# Patient Record
Sex: MALE | Race: WHITE | NOT HISPANIC OR LATINO | Employment: OTHER | URBAN - METROPOLITAN AREA
[De-identification: names, ages, dates, MRNs, and addresses within clinical notes are randomized per-mention and may not be internally consistent; named-entity substitution may affect disease eponyms.]

---

## 2019-06-19 ENCOUNTER — HOSPITAL ENCOUNTER (OUTPATIENT)
Dept: GENERAL RADIOLOGY | Facility: HOSPITAL | Age: 44
Discharge: HOME | End: 2019-06-19
Attending: INTERNAL MEDICINE

## 2021-01-05 ENCOUNTER — HOSPITAL ENCOUNTER (OUTPATIENT)
Dept: OTHER | Facility: HOSPITAL | Age: 46
Discharge: HOME | End: 2021-01-05
Attending: INTERNAL MEDICINE

## 2021-01-05 LAB — SARS-COV-2 RNA SPEC QL NAA+PROBE: DETECTED

## 2021-01-14 ENCOUNTER — HOSPITAL ENCOUNTER (OUTPATIENT)
Dept: GASTROENTEROLOGY | Facility: HOSPITAL | Age: 46
Discharge: HOME | End: 2021-01-14
Attending: INTERNAL MEDICINE

## 2021-01-14 ENCOUNTER — HOSPITAL ENCOUNTER (OUTPATIENT)
Dept: GENERAL RADIOLOGY | Facility: HOSPITAL | Age: 46
Discharge: HOME | End: 2021-01-14
Attending: INTERNAL MEDICINE

## 2021-07-20 ENCOUNTER — TRANSCRIBE ORDERS (OUTPATIENT)
Dept: LAB | Facility: HOSPITAL | Age: 46
End: 2021-07-20

## 2021-07-20 ENCOUNTER — LAB (OUTPATIENT)
Dept: LAB | Facility: HOSPITAL | Age: 46
End: 2021-07-20

## 2021-07-20 DIAGNOSIS — Z86.16 HISTORY OF COVID-19: ICD-10-CM

## 2021-07-20 DIAGNOSIS — R05.9 COUGH: ICD-10-CM

## 2021-07-20 DIAGNOSIS — R05.9 COUGH: Primary | ICD-10-CM

## 2021-07-20 LAB
ALBUMIN SERPL-MCNC: 4.2 G/DL (ref 3.5–5.2)
ALBUMIN/GLOB SERPL: 1.4 G/DL
ALP SERPL-CCNC: 53 U/L (ref 39–117)
ALT SERPL W P-5'-P-CCNC: 38 U/L (ref 1–41)
ANION GAP SERPL CALCULATED.3IONS-SCNC: 9.9 MMOL/L (ref 5–15)
AST SERPL-CCNC: 33 U/L (ref 1–40)
BASOPHILS # BLD AUTO: 0.1 10*3/MM3 (ref 0–0.2)
BASOPHILS NFR BLD AUTO: 1.4 % (ref 0–1.5)
BILIRUB SERPL-MCNC: 0.6 MG/DL (ref 0–1.2)
BUN SERPL-MCNC: 14 MG/DL (ref 6–20)
BUN/CREAT SERPL: 14.3 (ref 7–25)
CALCIUM SPEC-SCNC: 9.3 MG/DL (ref 8.6–10.5)
CHLORIDE SERPL-SCNC: 103 MMOL/L (ref 98–107)
CHOLEST SERPL-MCNC: 249 MG/DL (ref 0–200)
CO2 SERPL-SCNC: 23.1 MMOL/L (ref 22–29)
CREAT SERPL-MCNC: 0.98 MG/DL (ref 0.76–1.27)
DEPRECATED RDW RBC AUTO: 45.7 FL (ref 37–54)
EOSINOPHIL # BLD AUTO: 0.25 10*3/MM3 (ref 0–0.4)
EOSINOPHIL NFR BLD AUTO: 3.5 % (ref 0.3–6.2)
ERYTHROCYTE [DISTWIDTH] IN BLOOD BY AUTOMATED COUNT: 13.4 % (ref 12.3–15.4)
GFR SERPL CREATININE-BSD FRML MDRD: 83 ML/MIN/1.73
GLOBULIN UR ELPH-MCNC: 2.9 GM/DL
GLUCOSE SERPL-MCNC: 88 MG/DL (ref 65–99)
HCT VFR BLD AUTO: 51.4 % (ref 37.5–51)
HDLC SERPL-MCNC: 29 MG/DL (ref 40–60)
HGB BLD-MCNC: 17.7 G/DL (ref 13–17.7)
IMM GRANULOCYTES # BLD AUTO: 0.03 10*3/MM3 (ref 0–0.05)
IMM GRANULOCYTES NFR BLD AUTO: 0.4 % (ref 0–0.5)
LDLC SERPL CALC-MCNC: 120 MG/DL (ref 0–100)
LDLC/HDLC SERPL: 3.74 {RATIO}
LYMPHOCYTES # BLD AUTO: 2.03 10*3/MM3 (ref 0.7–3.1)
LYMPHOCYTES NFR BLD AUTO: 28.6 % (ref 19.6–45.3)
MCH RBC QN AUTO: 31.6 PG (ref 26.6–33)
MCHC RBC AUTO-ENTMCNC: 34.4 G/DL (ref 31.5–35.7)
MCV RBC AUTO: 91.8 FL (ref 79–97)
MONOCYTES # BLD AUTO: 0.67 10*3/MM3 (ref 0.1–0.9)
MONOCYTES NFR BLD AUTO: 9.4 % (ref 5–12)
NEUTROPHILS NFR BLD AUTO: 4.02 10*3/MM3 (ref 1.7–7)
NEUTROPHILS NFR BLD AUTO: 56.7 % (ref 42.7–76)
NRBC BLD AUTO-RTO: 0 /100 WBC (ref 0–0.2)
PLATELET # BLD AUTO: 203 10*3/MM3 (ref 140–450)
PMV BLD AUTO: 10.7 FL (ref 6–12)
POTASSIUM SERPL-SCNC: 4.5 MMOL/L (ref 3.5–5.2)
PROT SERPL-MCNC: 7.1 G/DL (ref 6–8.5)
RBC # BLD AUTO: 5.6 10*6/MM3 (ref 4.14–5.8)
SODIUM SERPL-SCNC: 136 MMOL/L (ref 136–145)
TRIGL SERPL-MCNC: 558 MG/DL (ref 0–150)
VLDLC SERPL-MCNC: 100 MG/DL (ref 5–40)
WBC # BLD AUTO: 7.1 10*3/MM3 (ref 3.4–10.8)

## 2021-07-20 PROCEDURE — 36415 COLL VENOUS BLD VENIPUNCTURE: CPT

## 2021-07-20 PROCEDURE — 80053 COMPREHEN METABOLIC PANEL: CPT

## 2021-07-20 PROCEDURE — 80061 LIPID PANEL: CPT

## 2021-07-20 PROCEDURE — 85025 COMPLETE CBC W/AUTO DIFF WBC: CPT

## 2021-07-21 RX ORDER — ACETAMINOPHEN AND CODEINE PHOSPHATE 300; 30 MG/1; MG/1
TABLET ORAL EVERY 6 HOURS SCHEDULED
COMMUNITY
End: 2021-11-13

## 2021-07-27 PROBLEM — U07.1 PNEUMONIA DUE TO COVID-19 VIRUS: Status: ACTIVE | Noted: 2021-07-27

## 2021-07-27 PROBLEM — J12.82 PNEUMONIA DUE TO COVID-19 VIRUS: Status: ACTIVE | Noted: 2021-07-27

## 2021-07-28 ENCOUNTER — OFFICE VISIT (OUTPATIENT)
Dept: INTERNAL MEDICINE | Facility: CLINIC | Age: 46
End: 2021-07-28

## 2021-07-28 VITALS
TEMPERATURE: 97.9 F | HEIGHT: 70 IN | BODY MASS INDEX: 39.43 KG/M2 | SYSTOLIC BLOOD PRESSURE: 130 MMHG | WEIGHT: 275.4 LBS | HEART RATE: 85 BPM | DIASTOLIC BLOOD PRESSURE: 92 MMHG | OXYGEN SATURATION: 98 %

## 2021-07-28 DIAGNOSIS — E66.01 CLASS 2 SEVERE OBESITY DUE TO EXCESS CALORIES WITH SERIOUS COMORBIDITY IN ADULT, UNSPECIFIED BMI (HCC): ICD-10-CM

## 2021-07-28 DIAGNOSIS — D75.1 POLYCYTHEMIA: ICD-10-CM

## 2021-07-28 DIAGNOSIS — U07.1 PNEUMONIA DUE TO COVID-19 VIRUS: Primary | ICD-10-CM

## 2021-07-28 DIAGNOSIS — E78.2 MIXED HYPERLIPIDEMIA: ICD-10-CM

## 2021-07-28 DIAGNOSIS — J12.82 PNEUMONIA DUE TO COVID-19 VIRUS: Primary | ICD-10-CM

## 2021-07-28 PROBLEM — E66.812 CLASS 2 SEVERE OBESITY DUE TO EXCESS CALORIES WITH SERIOUS COMORBIDITY IN ADULT: Status: ACTIVE | Noted: 2021-07-28

## 2021-07-28 PROCEDURE — 99214 OFFICE O/P EST MOD 30 MIN: CPT | Performed by: INTERNAL MEDICINE

## 2021-07-28 RX ORDER — CETIRIZINE HYDROCHLORIDE 10 MG/1
10 CAPSULE, LIQUID FILLED ORAL DAILY
COMMUNITY

## 2021-07-28 RX ORDER — LANOLIN ALCOHOL/MO/W.PET/CERES
50 CREAM (GRAM) TOPICAL DAILY
COMMUNITY

## 2021-07-28 RX ORDER — CHOLECALCIFEROL (VITAMIN D3) 125 MCG
500 CAPSULE ORAL DAILY
COMMUNITY

## 2021-07-28 RX ORDER — MELATONIN
1000 DAILY
COMMUNITY

## 2021-07-28 RX ORDER — ROSUVASTATIN CALCIUM 10 MG/1
10 TABLET, COATED ORAL NIGHTLY
Qty: 90 TABLET | Refills: 3 | Status: SHIPPED | OUTPATIENT
Start: 2021-07-28 | End: 2023-03-01

## 2021-07-28 RX ORDER — MULTIVIT WITH MINERALS/LUTEIN
250 TABLET ORAL DAILY
COMMUNITY

## 2021-07-28 NOTE — PROGRESS NOTES
"Chief Complaint  Follow-up (Had COVID in January, he is doing ok, he seems tired at night. )    Subjective  fatigue, no new breathing issues,           Calvin Nye presents to [facility]      Objective   Vital Signs  Vitals:    07/28/21 1306   BP: 130/92   Pulse: 85   Temp: 97.9 °F (36.6 °C)   SpO2: 98%   Weight: 125 kg (275 lb 6.4 oz)   Height: 177.8 cm (70\")      Review of Systems   Constitutional: Negative.    HENT: Negative.    Eyes: Negative.    Respiratory: Negative.    Cardiovascular: Negative.    Gastrointestinal: Negative.    Endocrine: Negative.    Genitourinary: Negative.    Musculoskeletal: Negative.    Allergic/Immunologic: Negative.    Neurological: Negative.    Hematological: Negative.    Psychiatric/Behavioral: Negative.     wgt gain    Physical Exam  Constitutional:       General: He is not in acute distress.     Appearance: Normal appearance.   HENT:      Head: Normocephalic.      Mouth/Throat:      Mouth: Mucous membranes are moist.   Eyes:      Conjunctiva/sclera: Conjunctivae normal.      Pupils: Pupils are equal, round, and reactive to light.   Cardiovascular:      Rate and Rhythm: Normal rate and regular rhythm.      Pulses: Normal pulses.      Heart sounds: Normal heart sounds.   Pulmonary:      Effort: Pulmonary effort is normal.      Breath sounds: Normal breath sounds.   Abdominal:      General: Abdomen is flat. Bowel sounds are normal.      Palpations: Abdomen is soft.   Musculoskeletal:         General: No swelling. Normal range of motion.      Cervical back: Neck supple.   Skin:     General: Skin is warm and dry.      Coloration: Skin is not jaundiced.   Neurological:      General: No focal deficit present.      Mental Status: He is alert and oriented to person, place, and time. Mental status is at baseline.   Psychiatric:         Mood and Affect: Mood normal.         Behavior: Behavior normal.         Thought Content: Thought content normal.  "        Judgment: Judgment normal.      obese, soft   Result Review :   Lab Results   Component Value Date     01/15/2021     CMP    CMP 1/18/21 1/19/21 7/20/21   Glucose   88   Glucose 96 87    BUN 21 20 14   Creatinine 0.82 0.70 0.98   eGFR Non African Am   83   Sodium 135 136 136   Potassium 4.1 4.4 4.5   Chloride 103 103 103   Calcium 9.3 9.6 9.3   Albumin 2.9 (A) 2.9 (A) 4.20   Total Bilirubin 0.40 0.42 0.6   Alkaline Phosphatase 35 (A) 37 (A) 53   AST (SGOT) 29 20 33   ALT (SGPT) 52 (A) 50 (A) 38   (A) Abnormal value       Comments are available for some flowsheets but are not being displayed.           CBC w/diff    CBC w/Diff 1/15/21 1/16/21 7/20/21   WBC 9.67 8.09 7.10   RBC 5.63 5.12 5.60   Hemoglobin 17.1 15.7 17.7   Hematocrit 49.5 45.3 51.4 (A)   MCV 87.9 88.5 91.8   MCH 30.4 30.7 31.6   MCHC 34.5 34.7 34.4   RDW 12.3 12.5 13.4   Platelets 251 235 203   Neutrophil Rel % 89.7 (A) 83.1 56.7   Immature Granulocyte Rel %   0.4   Lymphocyte Rel % 4.7 (A) 9.1 (A) 28.6   Monocyte Rel % 4.4 6.1 9.4   Eosinophil Rel % 0.0 0.0 3.5   Basophil Rel % 0.1 0.1 1.4   (A) Abnormal value             Lipid Panel    Lipid Panel 7/20/21   Total Cholesterol 249 (A)   Triglycerides 558 (A)   HDL Cholesterol 29 (A)   VLDL Cholesterol 100 (A)   LDL Cholesterol  120 (A)   LDL/HDL Ratio 3.74   (A) Abnormal value             Lab Results   Component Value Date    TSH 0.117 (L) 01/16/2021      Lab Results   Component Value Date    FREET4 0.9 01/16/2021                          Assessment and Plan    Elevated cholesterol, triglycerides, cautioned on alcohol use, recommend walking program, weight loss, and statin therapy to lower cardiac risks, will start with Crestor 10 mg daily, co-Q10 recommended with this, and recheck again in 4 to 6 months    Weight gain, discussed with patient today July 28, 2021    Increased hematocrit, polycythemia possibly secondary possibly secondary to obstructive sleep apnea discussed with patient  consider sleep study, other possibilities cannot be ruled out such as polycythemia vera, consider hematology oncology opinion if numbers do not improve by next visit,    Transitional care visit from hospital stay last week January 18 19th 2021, hypoxia acute respiratory failure, clinically improved, went home on 2 L of oxygen, finished up a course of roomed as severe in the hospital, IV steroids, clinically improved, COVID POSITIVE JAN 5, 2021 ,--      Follow Up   No follow-ups on file.  Patient was given instructions and counseling regarding his condition or for health maintenance advice. Please see specific information pulled into the AVS if appropriate.

## 2021-11-22 ENCOUNTER — OFFICE VISIT (OUTPATIENT)
Dept: INTERNAL MEDICINE | Facility: CLINIC | Age: 46
End: 2021-11-22

## 2021-11-22 ENCOUNTER — TELEPHONE (OUTPATIENT)
Dept: INTERNAL MEDICINE | Facility: CLINIC | Age: 46
End: 2021-11-22

## 2021-11-22 VITALS
TEMPERATURE: 97.6 F | BODY MASS INDEX: 37.8 KG/M2 | DIASTOLIC BLOOD PRESSURE: 82 MMHG | WEIGHT: 264 LBS | HEART RATE: 86 BPM | HEIGHT: 70 IN | OXYGEN SATURATION: 97 % | SYSTOLIC BLOOD PRESSURE: 124 MMHG

## 2021-11-22 DIAGNOSIS — J02.9 ACUTE PHARYNGITIS, UNSPECIFIED ETIOLOGY: Primary | ICD-10-CM

## 2021-11-22 DIAGNOSIS — R05.9 COUGH, UNSPECIFIED: ICD-10-CM

## 2021-11-22 PROCEDURE — 99213 OFFICE O/P EST LOW 20 MIN: CPT | Performed by: INTERNAL MEDICINE

## 2021-11-22 RX ORDER — BENZONATATE 100 MG/1
100 CAPSULE ORAL 3 TIMES DAILY PRN
Qty: 60 CAPSULE | Refills: 1 | Status: SHIPPED | OUTPATIENT
Start: 2021-11-22 | End: 2023-03-01

## 2021-11-22 RX ORDER — PREDNISONE 50 MG/1
50 TABLET ORAL DAILY
Qty: 5 TABLET | Refills: 0 | Status: SHIPPED | OUTPATIENT
Start: 2021-11-22 | End: 2023-03-01

## 2021-11-22 NOTE — TELEPHONE ENCOUNTER
"WIFE CALLED. PT WAS SEEN AT URGENT CARE ON 11/13 DX'D WITH TONSILLITIS, SWOLLEN GLANDS, FEVER.  GIVEN AMOX.  PATIENT HAS DEVELOPED \"A REALLY HORRIBLE COUGH\" PER WIFE. SHE IS WANTING HIM TO BE SEEN BY ANNA. NOT WILLING TO SEE NP.  STATES ANY TIME HE NEEDED TO BE SEEN, THE OFFICE WOULD WORK HIM IN.       WIFE STATES HE DOES NOT HAVE COVID (HAD COVID PNEUMONIA BACK IN July).      DOES PT. NEED TO BE SEEN?       JUSTINA # [phone]  "

## 2021-11-22 NOTE — PROGRESS NOTES
"Chief Complaint/ HPI: Recent tonsillitis, acute pharyngitis,, NOV 13, 2021, WENT TO [facility]UTE [facility] ,  NOW WITH COUGH, BEEN TRAVELING A LOT  Now with intractable coughing, especially at night,          Objective   Vital Signs  Vitals:    11/22/21 1625   BP: 124/82   BP Location: Left arm   Patient Position: Sitting   Pulse: 86   Temp: 97.6 °F (36.4 °C)   SpO2: 97%   Weight: 120 kg (264 lb)   Height: 177.8 cm (70\")      Body mass index is 37.88 kg/m².  Review of Systems   Constitutional: Negative.    HENT: Negative.    Eyes: Negative.    Respiratory: Negative.    Cardiovascular: Negative.    Gastrointestinal: Negative.    Endocrine: Negative.    Genitourinary: Negative.    Musculoskeletal: Negative.    Allergic/Immunologic: Negative.    Neurological: Negative.    Hematological: Negative.    Psychiatric/Behavioral: Negative.    , Coughing, no fevers currently,  SOME NAUSEA    Physical Exam --NECK normal,  THROAT tonsillar swelling, some erythema dark discoloration, no exudates, uvula slightly swollen, small airway noted,  LUNG clear bilaterally posterior,  CV, regular rhythm,  Alert oriented x3, no facial rashes,  Result Review :   Lab Results   Component Value Date     01/15/2021     CMP    CMP 1/18/21 1/19/21 7/20/21   Glucose 96 87 88   BUN 21 20 14   Creatinine 0.82 0.70 0.98   eGFR Non African Am   83   Sodium 135 136 136   Potassium 4.1 4.4 4.5   Chloride 103 103 103   Calcium 9.3 9.6 9.3   Albumin 2.9 (A) 2.9 (A) 4.20   Total Bilirubin 0.40 0.42 0.6   Alkaline Phosphatase 35 (A) 37 (A) 53   AST (SGOT) 29 20 33   ALT (SGPT) 52 (A) 50 (A) 38   (A) Abnormal value       Comments are available for some flowsheets but are not being displayed.           CBC w/diff    CBC w/Diff 1/15/21 1/16/21 7/20/21   WBC 9.67 8.09 7.10   RBC 5.63 5.12 5.60   Hemoglobin 17.1 15.7 17.7   Hematocrit 49.5 45.3 51.4 (A)   MCV 87.9 88.5 91.8   MCH 30.4 30.7 31.6   MCHC 34.5 34.7 34.4   RDW 12.3 12.5 13.4   Platelets 251 235 " 203   Neutrophil Rel % 89.7 (A) 83.1 56.7   Immature Granulocyte Rel %   0.4   Lymphocyte Rel % 4.7 (A) 9.1 (A) 28.6   Monocyte Rel % 4.4 6.1 9.4   Eosinophil Rel % 0.0 0.0 3.5   Basophil Rel % 0.1 0.1 1.4   (A) Abnormal value             Lipid Panel    Lipid Panel 7/20/21   Total Cholesterol 249 (A)   Triglycerides 558 (A)   HDL Cholesterol 29 (A)   VLDL Cholesterol 100 (A)   LDL Cholesterol  120 (A)   LDL/HDL Ratio 3.74   (A) Abnormal value             Lab Results   Component Value Date    TSH 0.117 (L) 01/16/2021      Lab Results   Component Value Date    FREET4 0.9 01/16/2021                          Assessment and Plan   Diagnoses and all orders for this visit:    1. Acute pharyngitis, unspecified etiology (Primary)  -     HYDROcod Polst-CPM Polst ER (Tussionex Pennkinetic ER) 10-8 MG/5ML ER suspension; Take 5 mL by mouth Every 12 (Twelve) Hours As Needed for Cough.  Dispense: 100 mL; Refill: 0    2. Cough, unspecified  -     HYDROcod Polst-CPM Polst ER (Tussionex Pennkinetic ER) 10-8 MG/5ML ER suspension; Take 5 mL by mouth Every 12 (Twelve) Hours As Needed for Cough.  Dispense: 100 mL; Refill: 0    Other orders  -     predniSONE (DELTASONE) 50 MG tablet; Take 1 tablet by mouth Daily.  Dispense: 5 tablet; Refill: 0  -     benzonatate (Tessalon Perles) 100 MG capsule; Take 1 capsule by mouth 3 (Three) Times a Day As Needed for Cough.  Dispense: 60 capsule; Refill: 1      Acute pharyngitis, status post treatment with Augmentin, finishing course as of November 23, 2021, now with continued cough, will treat with Tussionex, some prednisone possibly, consider some allergy medication, patient is currently taking Zyrtec, will treat with Tessalon Perles also,    Follow Up   No follow-ups on file.  Patient was given instructions and counseling regarding his condition or for health maintenance advice. Please see specific information pulled into the AVS if appropriate.

## 2021-12-06 ENCOUNTER — TELEPHONE (OUTPATIENT)
Dept: INTERNAL MEDICINE | Facility: CLINIC | Age: 46
End: 2021-12-06

## 2021-12-06 DIAGNOSIS — R05.9 COUGH: Primary | ICD-10-CM

## 2021-12-06 RX ORDER — BENZONATATE 100 MG/1
100 CAPSULE ORAL 3 TIMES DAILY PRN
Qty: 45 CAPSULE | Refills: 2 | Status: SHIPPED | OUTPATIENT
Start: 2021-12-06 | End: 2023-03-01

## 2021-12-06 RX ORDER — DEXAMETHASONE 4 MG/1
4 TABLET ORAL 3 TIMES DAILY
Qty: 15 TABLET | Refills: 0 | Status: SHIPPED | OUTPATIENT
Start: 2021-12-06 | End: 2023-03-01

## 2021-12-06 RX ORDER — AZITHROMYCIN 250 MG/1
TABLET, FILM COATED ORAL
Qty: 6 TABLET | Refills: 0 | Status: SHIPPED | OUTPATIENT
Start: 2021-12-06 | End: 2021-12-11

## 2021-12-06 NOTE — TELEPHONE ENCOUNTER
Z-Jonathan  Tussionex 5 cc every 12 hours  Decadron 4 mg 3 times daily #15 no refills  Tessalon Perles    Tell patient I called all these in for him to his drugstore that is listed

## 2021-12-06 NOTE — TELEPHONE ENCOUNTER
"Patient called and said he was seen in the office on nov, 22nd and was given a steroid to help with pharyngitis and was given a cough medicine to help with his cough. Patient states the cough has gotten worse and now sounds like a \"loud barking cough\" . He is wanting to know what else he can do or take to help this   "

## 2022-01-27 ENCOUNTER — LAB (OUTPATIENT)
Dept: LAB | Facility: HOSPITAL | Age: 47
End: 2022-01-27

## 2022-01-27 DIAGNOSIS — U07.1 PNEUMONIA DUE TO COVID-19 VIRUS: ICD-10-CM

## 2022-01-27 DIAGNOSIS — E78.2 MIXED HYPERLIPIDEMIA: ICD-10-CM

## 2022-01-27 DIAGNOSIS — D75.1 POLYCYTHEMIA: ICD-10-CM

## 2022-01-27 DIAGNOSIS — E66.01 CLASS 2 SEVERE OBESITY DUE TO EXCESS CALORIES WITH SERIOUS COMORBIDITY IN ADULT, UNSPECIFIED BMI: ICD-10-CM

## 2022-01-27 DIAGNOSIS — J12.82 PNEUMONIA DUE TO COVID-19 VIRUS: ICD-10-CM

## 2022-01-27 LAB
ALBUMIN SERPL-MCNC: 4.5 G/DL (ref 3.5–5.2)
ALBUMIN/GLOB SERPL: 1.7 G/DL
ALP SERPL-CCNC: 51 U/L (ref 39–117)
ALT SERPL W P-5'-P-CCNC: 29 U/L (ref 1–41)
ANION GAP SERPL CALCULATED.3IONS-SCNC: 11.2 MMOL/L (ref 5–15)
AST SERPL-CCNC: 25 U/L (ref 1–40)
BASOPHILS # BLD AUTO: 0.11 10*3/MM3 (ref 0–0.2)
BASOPHILS NFR BLD AUTO: 1.4 % (ref 0–1.5)
BILIRUB SERPL-MCNC: 0.4 MG/DL (ref 0–1.2)
BUN SERPL-MCNC: 17 MG/DL (ref 6–20)
BUN/CREAT SERPL: 17.5 (ref 7–25)
CALCIUM SPEC-SCNC: 9.6 MG/DL (ref 8.6–10.5)
CHLORIDE SERPL-SCNC: 104 MMOL/L (ref 98–107)
CHOLEST SERPL-MCNC: 151 MG/DL (ref 0–200)
CO2 SERPL-SCNC: 23.8 MMOL/L (ref 22–29)
CREAT SERPL-MCNC: 0.97 MG/DL (ref 0.76–1.27)
DEPRECATED RDW RBC AUTO: 45.8 FL (ref 37–54)
EOSINOPHIL # BLD AUTO: 0.23 10*3/MM3 (ref 0–0.4)
EOSINOPHIL NFR BLD AUTO: 3 % (ref 0.3–6.2)
ERYTHROCYTE [DISTWIDTH] IN BLOOD BY AUTOMATED COUNT: 13.7 % (ref 12.3–15.4)
GFR SERPL CREATININE-BSD FRML MDRD: 84 ML/MIN/1.73
GLOBULIN UR ELPH-MCNC: 2.6 GM/DL
GLUCOSE SERPL-MCNC: 98 MG/DL (ref 65–99)
HCT VFR BLD AUTO: 47.1 % (ref 37.5–51)
HDLC SERPL-MCNC: 30 MG/DL (ref 40–60)
HGB BLD-MCNC: 16.3 G/DL (ref 13–17.7)
IMM GRANULOCYTES # BLD AUTO: 0.02 10*3/MM3 (ref 0–0.05)
IMM GRANULOCYTES NFR BLD AUTO: 0.3 % (ref 0–0.5)
LDLC SERPL CALC-MCNC: 83 MG/DL (ref 0–100)
LDLC/HDLC SERPL: 2.53 {RATIO}
LYMPHOCYTES # BLD AUTO: 2.47 10*3/MM3 (ref 0.7–3.1)
LYMPHOCYTES NFR BLD AUTO: 32 % (ref 19.6–45.3)
MCH RBC QN AUTO: 31.5 PG (ref 26.6–33)
MCHC RBC AUTO-ENTMCNC: 34.6 G/DL (ref 31.5–35.7)
MCV RBC AUTO: 90.9 FL (ref 79–97)
MONOCYTES # BLD AUTO: 0.74 10*3/MM3 (ref 0.1–0.9)
MONOCYTES NFR BLD AUTO: 9.6 % (ref 5–12)
NEUTROPHILS NFR BLD AUTO: 4.16 10*3/MM3 (ref 1.7–7)
NEUTROPHILS NFR BLD AUTO: 53.7 % (ref 42.7–76)
NRBC BLD AUTO-RTO: 0 /100 WBC (ref 0–0.2)
PLATELET # BLD AUTO: 210 10*3/MM3 (ref 140–450)
PMV BLD AUTO: 10.8 FL (ref 6–12)
POTASSIUM SERPL-SCNC: 4.2 MMOL/L (ref 3.5–5.2)
PROT SERPL-MCNC: 7.1 G/DL (ref 6–8.5)
RBC # BLD AUTO: 5.18 10*6/MM3 (ref 4.14–5.8)
SODIUM SERPL-SCNC: 139 MMOL/L (ref 136–145)
T4 FREE SERPL-MCNC: 0.9 NG/DL (ref 0.93–1.7)
TRIGL SERPL-MCNC: 225 MG/DL (ref 0–150)
TSH SERPL DL<=0.05 MIU/L-ACNC: 1.39 UIU/ML (ref 0.27–4.2)
VLDLC SERPL-MCNC: 38 MG/DL (ref 5–40)
WBC NRBC COR # BLD: 7.73 10*3/MM3 (ref 3.4–10.8)

## 2022-01-27 PROCEDURE — 80061 LIPID PANEL: CPT

## 2022-01-27 PROCEDURE — 36415 COLL VENOUS BLD VENIPUNCTURE: CPT

## 2022-01-27 PROCEDURE — 80050 GENERAL HEALTH PANEL: CPT

## 2022-01-27 PROCEDURE — 84439 ASSAY OF FREE THYROXINE: CPT

## 2022-02-16 ENCOUNTER — OFFICE VISIT (OUTPATIENT)
Dept: INTERNAL MEDICINE | Facility: CLINIC | Age: 47
End: 2022-02-16

## 2022-02-16 VITALS
WEIGHT: 273.6 LBS | OXYGEN SATURATION: 96 % | SYSTOLIC BLOOD PRESSURE: 138 MMHG | HEIGHT: 70 IN | DIASTOLIC BLOOD PRESSURE: 84 MMHG | HEART RATE: 103 BPM | BODY MASS INDEX: 39.17 KG/M2 | TEMPERATURE: 97.7 F

## 2022-02-16 DIAGNOSIS — R53.83 FATIGUE, UNSPECIFIED TYPE: ICD-10-CM

## 2022-02-16 DIAGNOSIS — Z12.5 SCREENING PSA (PROSTATE SPECIFIC ANTIGEN): ICD-10-CM

## 2022-02-16 DIAGNOSIS — E78.2 MIXED HYPERLIPIDEMIA: Primary | ICD-10-CM

## 2022-02-16 DIAGNOSIS — E66.09 CLASS 2 OBESITY DUE TO EXCESS CALORIES WITHOUT SERIOUS COMORBIDITY WITH BODY MASS INDEX (BMI) OF 37.0 TO 37.9 IN ADULT: ICD-10-CM

## 2022-02-16 DIAGNOSIS — R05.9 COUGH, UNSPECIFIED: ICD-10-CM

## 2022-02-16 PROCEDURE — 99396 PREV VISIT EST AGE 40-64: CPT | Performed by: INTERNAL MEDICINE

## 2022-02-16 RX ORDER — ROSUVASTATIN CALCIUM 10 MG/1
10 TABLET, COATED ORAL DAILY
Qty: 90 TABLET | Refills: 3 | Status: SHIPPED | OUTPATIENT
Start: 2022-02-16 | End: 2023-03-31

## 2022-02-16 NOTE — PROGRESS NOTES
"Chief Complaint/ HPI: cough gone  Uri better  Here for preventive exam  No cp, no soa      Objective   Vital Signs  Vitals:    02/16/22 1304   BP: 138/84   BP Location: Left arm   Patient Position: Sitting   Cuff Size: Large Adult   Pulse: 103   Temp: 97.7 °F (36.5 °C)   SpO2: 96%   Weight: 124 kg (273 lb 9.6 oz)   Height: 177.8 cm (70\")      Body mass index is 39.26 kg/m².  Review of Systems   Constitutional: Negative.    HENT: Negative.    Eyes: Negative.    Respiratory: Negative.    Cardiovascular: Negative.    Gastrointestinal: Negative.    Endocrine: Negative.    Genitourinary: Negative.    Musculoskeletal: Negative.    Allergic/Immunologic: Negative.    Neurological: Negative.    Hematological: Negative.    Psychiatric/Behavioral: Negative.       Physical Exam  Constitutional:       General: He is not in acute distress.     Appearance: Normal appearance. He is obese.   HENT:      Head: Normocephalic.      Mouth/Throat:      Mouth: Mucous membranes are moist.   Eyes:      Conjunctiva/sclera: Conjunctivae normal.      Pupils: Pupils are equal, round, and reactive to light.   Cardiovascular:      Rate and Rhythm: Normal rate and regular rhythm.      Pulses: Normal pulses.      Heart sounds: Normal heart sounds.   Pulmonary:      Effort: Pulmonary effort is normal.      Breath sounds: Normal breath sounds.   Abdominal:      General: Bowel sounds are normal.      Palpations: Abdomen is soft.   Musculoskeletal:         General: No swelling. Normal range of motion.      Cervical back: Neck supple.   Skin:     General: Skin is warm and dry.      Coloration: Skin is not jaundiced.   Neurological:      General: No focal deficit present.      Mental Status: He is alert and oriented to person, place, and time. Mental status is at baseline.   Psychiatric:         Mood and Affect: Mood normal.         Behavior: Behavior normal.         Thought Content: Thought content normal.         Judgment: Judgment normal.        Result " Review :   Lab Results   Component Value Date     01/15/2021     CMP    CMP 7/20/21 1/27/22   Glucose 88 98   BUN 14 17   Creatinine 0.98 0.97   eGFR Non African Am 83 84   Sodium 136 139   Potassium 4.5 4.2   Chloride 103 104   Calcium 9.3 9.6   Albumin 4.20 4.50   Total Bilirubin 0.6 0.4   Alkaline Phosphatase 53 51   AST (SGOT) 33 25   ALT (SGPT) 38 29           CBC w/diff    CBC w/Diff 7/20/21 1/27/22   WBC 7.10 7.73   RBC 5.60 5.18   Hemoglobin 17.7 16.3   Hematocrit 51.4 (A) 47.1   MCV 91.8 90.9   MCH 31.6 31.5   MCHC 34.4 34.6   RDW 13.4 13.7   Platelets 203 210   Neutrophil Rel % 56.7 53.7   Immature Granulocyte Rel % 0.4 0.3   Lymphocyte Rel % 28.6 32.0   Monocyte Rel % 9.4 9.6   Eosinophil Rel % 3.5 3.0   Basophil Rel % 1.4 1.4   (A) Abnormal value             Lipid Panel    Lipid Panel 7/20/21 1/27/22   Total Cholesterol 249 (A) 151   Triglycerides 558 (A) 225 (A)   HDL Cholesterol 29 (A) 30 (A)   VLDL Cholesterol 100 (A) 38   LDL Cholesterol  120 (A) 83   LDL/HDL Ratio 3.74 2.53   (A) Abnormal value             Lab Results   Component Value Date    TSH 1.390 01/27/2022    TSH 0.117 (L) 01/16/2021      Lab Results   Component Value Date    FREET4 0.90 (L) 01/27/2022    FREET4 0.9 01/16/2021                          Visit Diagnoses:    ICD-10-CM ICD-9-CM   1. Mixed hyperlipidemia  E78.2 272.2   2. Cough, unspecified  R05.9 786.2   3. Class 2 obesity due to excess calories without serious comorbidity with body mass index (BMI) of 37.0 to 37.9 in adult  E66.09 278.00    Z68.37 V85.37   4. Fatigue, unspecified type  R53.83 780.79   5. Screening PSA (prostate specific antigen)  Z12.5 V76.44       Assessment and Plan   Diagnoses and all orders for this visit:    1. Mixed hyperlipidemia (Primary)  -     Lipid Panel; Future  -     Comprehensive Metabolic Panel; Future  -     CBC & Differential; Future  -     PSA Screen; Future  -     COVID-19,APTIMA PANTHER([place]),[facility], NP/OP SWAB IN UTM/VTM/[facility]  TRANSPORT MEDIA,24 HR TAT - Swab, Nasopharynx; Future    2. Cough, unspecified  -     Lipid Panel; Future  -     Comprehensive Metabolic Panel; Future  -     CBC & Differential; Future  -     PSA Screen; Future  -     COVID-19,APTIMA PANTHER([place]),[facility], NP/OP SWAB IN UTM/VTM/[facility] TRANSPORT MEDIA,24 HR TAT - Swab, Nasopharynx; Future    3. Class 2 obesity due to excess calories without serious comorbidity with body mass index (BMI) of 37.0 to 37.9 in adult  -     Lipid Panel; Future  -     Comprehensive Metabolic Panel; Future  -     CBC & Differential; Future  -     PSA Screen; Future  -     COVID-19,APTIMA PANTHER([place]),[facility], NP/OP SWAB IN UTM/VTM/[facility] TRANSPORT MEDIA,24 HR TAT - Swab, Nasopharynx; Future    4. Fatigue, unspecified type  -     Lipid Panel; Future  -     Comprehensive Metabolic Panel; Future  -     CBC & Differential; Future  -     PSA Screen; Future  -     COVID-19,APTIMA PANTHER([place]),[facility], NP/OP SWAB IN UTM/VTM/[facility] TRANSPORT MEDIA,24 HR TAT - Swab, Nasopharynx; Future    5. Screening PSA (prostate specific antigen)  -     Lipid Panel; Future  -     Comprehensive Metabolic Panel; Future  -     CBC & Differential; Future  -     PSA Screen; Future  -     COVID-19,APTIMA PANTHER([place]),[facility], NP/OP SWAB IN UTM/VTM/[facility] TRANSPORT MEDIA,24 HR TAT - Swab, Nasopharynx; Future    Other orders  -     rosuvastatin (Crestor) 10 MG tablet; Take 1 tablet by mouth Daily.  Dispense: 90 tablet; Refill: 3    Patient needs Covid testing prior to travel next week, February 2022    overwgt--encouraged wgt loss,     Elevated chol, --improved , cont crestor 10 mg qhs , qunol qhs     Fatigue    Recent uri jan 2022 resolved         Follow Up {Instructions Charge Capture  Follow-up Communications :23}  Return in about 1 year (around 2/16/2023).  Patient was given instructions and counseling regarding his condition or for health maintenance advice. Please see specific information  pulled into the AVS if appropriate.

## 2023-02-16 ENCOUNTER — LAB (OUTPATIENT)
Dept: INTERNAL MEDICINE | Facility: CLINIC | Age: 48
End: 2023-02-16
Payer: COMMERCIAL

## 2023-02-16 DIAGNOSIS — R53.83 FATIGUE, UNSPECIFIED TYPE: ICD-10-CM

## 2023-02-16 DIAGNOSIS — E66.09 CLASS 2 OBESITY DUE TO EXCESS CALORIES WITHOUT SERIOUS COMORBIDITY WITH BODY MASS INDEX (BMI) OF 37.0 TO 37.9 IN ADULT: ICD-10-CM

## 2023-02-16 DIAGNOSIS — E78.2 MIXED HYPERLIPIDEMIA: ICD-10-CM

## 2023-02-16 DIAGNOSIS — Z12.5 SCREENING PSA (PROSTATE SPECIFIC ANTIGEN): ICD-10-CM

## 2023-02-16 DIAGNOSIS — R05.9 COUGH, UNSPECIFIED: ICD-10-CM

## 2023-02-16 LAB
ALBUMIN SERPL-MCNC: 4.6 G/DL (ref 3.5–5.2)
ALBUMIN/GLOB SERPL: 1.7 G/DL
ALP SERPL-CCNC: 56 U/L (ref 39–117)
ALT SERPL W P-5'-P-CCNC: 34 U/L (ref 1–41)
ANION GAP SERPL CALCULATED.3IONS-SCNC: 10.7 MMOL/L (ref 5–15)
AST SERPL-CCNC: 25 U/L (ref 1–40)
BASOPHILS # BLD AUTO: 0.11 10*3/MM3 (ref 0–0.2)
BASOPHILS NFR BLD AUTO: 1.4 % (ref 0–1.5)
BILIRUB SERPL-MCNC: 0.4 MG/DL (ref 0–1.2)
BUN SERPL-MCNC: 21 MG/DL (ref 6–20)
BUN/CREAT SERPL: 19.1 (ref 7–25)
CALCIUM SPEC-SCNC: 9.7 MG/DL (ref 8.6–10.5)
CHLORIDE SERPL-SCNC: 101 MMOL/L (ref 98–107)
CHOLEST SERPL-MCNC: 134 MG/DL (ref 0–200)
CO2 SERPL-SCNC: 25.3 MMOL/L (ref 22–29)
CREAT SERPL-MCNC: 1.1 MG/DL (ref 0.76–1.27)
DEPRECATED RDW RBC AUTO: 43.2 FL (ref 37–54)
EGFRCR SERPLBLD CKD-EPI 2021: 83.8 ML/MIN/1.73
EOSINOPHIL # BLD AUTO: 0.21 10*3/MM3 (ref 0–0.4)
EOSINOPHIL NFR BLD AUTO: 2.6 % (ref 0.3–6.2)
ERYTHROCYTE [DISTWIDTH] IN BLOOD BY AUTOMATED COUNT: 13 % (ref 12.3–15.4)
GLOBULIN UR ELPH-MCNC: 2.7 GM/DL
GLUCOSE SERPL-MCNC: 99 MG/DL (ref 65–99)
HCT VFR BLD AUTO: 47.2 % (ref 37.5–51)
HDLC SERPL-MCNC: 38 MG/DL (ref 40–60)
HGB BLD-MCNC: 16.5 G/DL (ref 13–17.7)
IMM GRANULOCYTES # BLD AUTO: 0.03 10*3/MM3 (ref 0–0.05)
IMM GRANULOCYTES NFR BLD AUTO: 0.4 % (ref 0–0.5)
LDLC SERPL CALC-MCNC: 61 MG/DL (ref 0–100)
LDLC/HDLC SERPL: 1.41 {RATIO}
LYMPHOCYTES # BLD AUTO: 2.58 10*3/MM3 (ref 0.7–3.1)
LYMPHOCYTES NFR BLD AUTO: 32.1 % (ref 19.6–45.3)
MCH RBC QN AUTO: 31.9 PG (ref 26.6–33)
MCHC RBC AUTO-ENTMCNC: 35 G/DL (ref 31.5–35.7)
MCV RBC AUTO: 91.3 FL (ref 79–97)
MONOCYTES # BLD AUTO: 0.79 10*3/MM3 (ref 0.1–0.9)
MONOCYTES NFR BLD AUTO: 9.8 % (ref 5–12)
NEUTROPHILS NFR BLD AUTO: 4.31 10*3/MM3 (ref 1.7–7)
NEUTROPHILS NFR BLD AUTO: 53.7 % (ref 42.7–76)
NRBC BLD AUTO-RTO: 0 /100 WBC (ref 0–0.2)
PLATELET # BLD AUTO: 217 10*3/MM3 (ref 140–450)
PMV BLD AUTO: 10.7 FL (ref 6–12)
POTASSIUM SERPL-SCNC: 4.3 MMOL/L (ref 3.5–5.2)
PROT SERPL-MCNC: 7.3 G/DL (ref 6–8.5)
PSA SERPL-MCNC: 0.77 NG/ML (ref 0–4)
RBC # BLD AUTO: 5.17 10*6/MM3 (ref 4.14–5.8)
SODIUM SERPL-SCNC: 137 MMOL/L (ref 136–145)
TRIGL SERPL-MCNC: 212 MG/DL (ref 0–150)
VLDLC SERPL-MCNC: 35 MG/DL (ref 5–40)
WBC NRBC COR # BLD: 8.03 10*3/MM3 (ref 3.4–10.8)

## 2023-02-16 PROCEDURE — G0103 PSA SCREENING: HCPCS | Performed by: INTERNAL MEDICINE

## 2023-02-16 PROCEDURE — 80053 COMPREHEN METABOLIC PANEL: CPT | Performed by: INTERNAL MEDICINE

## 2023-02-16 PROCEDURE — 36415 COLL VENOUS BLD VENIPUNCTURE: CPT | Performed by: INTERNAL MEDICINE

## 2023-02-16 PROCEDURE — 80061 LIPID PANEL: CPT | Performed by: INTERNAL MEDICINE

## 2023-02-16 PROCEDURE — 85025 COMPLETE CBC W/AUTO DIFF WBC: CPT | Performed by: INTERNAL MEDICINE

## 2023-03-01 ENCOUNTER — OFFICE VISIT (OUTPATIENT)
Dept: INTERNAL MEDICINE | Facility: CLINIC | Age: 48
End: 2023-03-01
Payer: COMMERCIAL

## 2023-03-01 VITALS
HEART RATE: 86 BPM | OXYGEN SATURATION: 96 % | WEIGHT: 286.2 LBS | HEIGHT: 70 IN | DIASTOLIC BLOOD PRESSURE: 93 MMHG | SYSTOLIC BLOOD PRESSURE: 143 MMHG | TEMPERATURE: 97.8 F | BODY MASS INDEX: 40.97 KG/M2

## 2023-03-01 DIAGNOSIS — E78.2 MIXED HYPERLIPIDEMIA: ICD-10-CM

## 2023-03-01 DIAGNOSIS — E66.09 CLASS 2 OBESITY DUE TO EXCESS CALORIES WITHOUT SERIOUS COMORBIDITY WITH BODY MASS INDEX (BMI) OF 37.0 TO 37.9 IN ADULT: ICD-10-CM

## 2023-03-01 DIAGNOSIS — Z00.00 PHYSICAL EXAM, ANNUAL: ICD-10-CM

## 2023-03-01 DIAGNOSIS — D75.1 POLYCYTHEMIA: ICD-10-CM

## 2023-03-01 DIAGNOSIS — Z12.5 SCREENING PSA (PROSTATE SPECIFIC ANTIGEN): Primary | ICD-10-CM

## 2023-03-01 PROCEDURE — 99396 PREV VISIT EST AGE 40-64: CPT | Performed by: INTERNAL MEDICINE

## 2023-03-01 NOTE — PROGRESS NOTES
"CHIEF COMPLAINT/ HPI:  Annual Exam (YEARLY)  Here to follow-up with history of high cholesterol recent lab work, weight issues,    , Here for his annual checkup,  Preventive measures discussed, he wears a seatbelt, does not smoke, we discussed alcohol use, we encourage more aerobic activity, he is active and works on a regular basis,    Patient is using some preventative measures with Mucinex and some allergy medication to help prevent sinus infections which he gets on a regular basis throughout the year, seems to be working well he has not had any recent infections,    Less energy       Objective   Vital Signs  Vitals:    03/01/23 1458   BP: 143/93   Pulse: 86   Temp: 97.8 °F (36.6 °C)   SpO2: 96%   Weight: 130 kg (286 lb 3.2 oz)   Height: 177.8 cm (70\")      Body mass index is 41.07 kg/m².  Review of Systems   Constitutional: Negative.    HENT: Negative.    Eyes: Negative.    Respiratory: Negative.    Cardiovascular: Negative.    Gastrointestinal: Negative.    Endocrine: Negative.    Genitourinary: Negative.    Musculoskeletal: Negative.    Allergic/Immunologic: Negative.    Neurological: Negative.    Hematological: Negative.    Psychiatric/Behavioral: Negative.       Physical Exam  Constitutional:       General: He is not in acute distress.     Appearance: Normal appearance. He is obese.   HENT:      Head: Normocephalic.      Mouth/Throat:      Mouth: Mucous membranes are moist.   Eyes:      Conjunctiva/sclera: Conjunctivae normal.      Pupils: Pupils are equal, round, and reactive to light.   Cardiovascular:      Rate and Rhythm: Normal rate and regular rhythm.      Pulses: Normal pulses.      Heart sounds: Normal heart sounds.   Pulmonary:      Effort: Pulmonary effort is normal.      Breath sounds: Normal breath sounds.   Abdominal:      General: Bowel sounds are normal.      Palpations: Abdomen is soft.   Musculoskeletal:         General: No swelling. Normal range of motion.      Cervical back: Neck supple. "   Skin:     General: Skin is warm and dry.      Coloration: Skin is not jaundiced.   Neurological:      General: No focal deficit present.      Mental Status: He is alert and oriented to person, place, and time. Mental status is at baseline.   Psychiatric:         Mood and Affect: Mood normal.         Behavior: Behavior normal.         Thought Content: Thought content normal.         Judgment: Judgment normal.      trace edema b/l   Result Review :   Lab Results   Component Value Date     01/15/2021     CMP    CMP 2/16/23   Glucose 99   BUN 21 (A)   Creatinine 1.10   eGFR 83.8   Sodium 137   Potassium 4.3   Chloride 101   Calcium 9.7   Total Protein 7.3   Albumin 4.6   Globulin 2.7   Total Bilirubin 0.4   Alkaline Phosphatase 56   AST (SGOT) 25   ALT (SGPT) 34   Albumin/Globulin Ratio 1.7   BUN/Creatinine Ratio 19.1   Anion Gap 10.7   (A) Abnormal value            CBC w/diff    CBC w/Diff 2/16/23   WBC 8.03   RBC 5.17   Hemoglobin 16.5   Hematocrit 47.2   MCV 91.3   MCH 31.9   MCHC 35.0   RDW 13.0   Platelets 217   Neutrophil Rel % 53.7   Immature Granulocyte Rel % 0.4   Lymphocyte Rel % 32.1   Monocyte Rel % 9.8   Eosinophil Rel % 2.6   Basophil Rel % 1.4            Lipid Panel    Lipid Panel 2/16/23   Total Cholesterol 134   Triglycerides 212 (A)   HDL Cholesterol 38 (A)   VLDL Cholesterol 35   LDL Cholesterol  61   LDL/HDL Ratio 1.41   (A) Abnormal value             Lab Results   Component Value Date    TSH 1.390 01/27/2022    TSH 0.117 (L) 01/16/2021      Lab Results   Component Value Date    FREET4 0.90 (L) 01/27/2022    FREET4 0.9 01/16/2021         PSA    PSA 2/16/23   PSA 0.768                          Visit Diagnoses:    ICD-10-CM ICD-9-CM   1. Screening PSA (prostate specific antigen)  Z12.5 V76.44   2. Class 2 obesity due to excess calories without serious comorbidity with body mass index (BMI) of 37.0 to 37.9 in adult  E66.09 278.00    Z68.37 V85.37   3. Mixed hyperlipidemia  E78.2 272.2   4.  Polycythemia  D75.1 238.4   5. Physical exam, annual  Z00.00 V70.0       Assessment and Plan   Diagnoses and all orders for this visit:    1. Screening PSA (prostate specific antigen) (Primary)  -     Comprehensive Metabolic Panel; Future  -     CBC & Differential; Future  -     Lipid Panel; Future  -     PSA Screen; Future    2. Class 2 obesity due to excess calories without serious comorbidity with body mass index (BMI) of 37.0 to 37.9 in adult  -     Comprehensive Metabolic Panel; Future  -     CBC & Differential; Future  -     Lipid Panel; Future  -     PSA Screen; Future    3. Mixed hyperlipidemia  -     Comprehensive Metabolic Panel; Future  -     CBC & Differential; Future  -     Lipid Panel; Future  -     PSA Screen; Future    4. Polycythemia  -     Comprehensive Metabolic Panel; Future  -     CBC & Differential; Future  -     Lipid Panel; Future  -     PSA Screen; Future    5. Physical exam, annual  -     Comprehensive Metabolic Panel; Future  -     CBC & Differential; Future  -     Lipid Panel; Future  -     PSA Screen; Future    Other orders  -     Semaglutide-Weight Management 0.5 MG/0.5ML solution auto-injector; Inject 0.5 mL under the skin into the appropriate area as directed 1 (One) Time Per Week.  Dispense: 2 mL; Refill: 5    Annual exam, preventive measures discussed as above, March 1, 2023 continue exercise,    overwgt--weight up this year, March 1, 2023 encouraged wgt loss, March 1, 2023,, monitor carbs, increase protein intake and encouraged aerobic activity with walking on a regular basis,--- consider Wegovy, injections, discussed we will send in prescription to see if that helps and follow-up in 3 months,    Elevated chol, --improved , cont crestor 10 mg qhs , qunol qhs     Fatigue--consider sleep study , wgt loss--March 1, 2023,    PSA 0.7 February 16, 2023      Follow Up   Return in about 3 months (around 6/1/2023).  Patient was given instructions and counseling regarding his condition or  for health maintenance advice. Please see specific information pulled into the AVS if appropriate.

## 2023-03-31 RX ORDER — ROSUVASTATIN CALCIUM 10 MG/1
TABLET, COATED ORAL
Qty: 90 TABLET | Refills: 3 | Status: SHIPPED | OUTPATIENT
Start: 2023-03-31

## 2023-04-21 ENCOUNTER — TELEPHONE (OUTPATIENT)
Dept: INTERNAL MEDICINE | Facility: CLINIC | Age: 48
End: 2023-04-21
Payer: COMMERCIAL

## 2023-04-21 RX ORDER — SEMAGLUTIDE 1 MG/.5ML
1 INJECTION, SOLUTION SUBCUTANEOUS WEEKLY
Qty: 2 ML | Refills: 5 | Status: SHIPPED | OUTPATIENT
Start: 2023-04-21

## 2023-04-21 NOTE — TELEPHONE ENCOUNTER
Call patient, tell him I sent in the next dose for the Wegovy for a month and if he wants to go up again after that to let me know

## 2023-04-21 NOTE — TELEPHONE ENCOUNTER
Caller: Calvin Nye    Relationship: Self    Best call back number: [phone]    What medications are you currently taking:   Current Outpatient Medications on File Prior to Visit   Medication Sig Dispense Refill   • Cetirizine HCl (ZyrTEC Allergy) 10 MG capsule Take 10 mg by mouth Daily.     • cholecalciferol (VITAMIN D3) 25 MCG (1000 UT) tablet Take 1 tablet by mouth Daily.     • rosuvastatin (CRESTOR) 10 MG tablet TAKE 1 TABLET BY MOUTH EVERY DAY 90 tablet 3   • Semaglutide-Weight Management 0.5 MG/0.5ML solution auto-injector Inject 0.5 mL under the skin into the appropriate area as directed 1 (One) Time Per Week. 2 mL 5   • vitamin B-12 (CYANOCOBALAMIN) 500 MCG tablet Take 1 tablet by mouth Daily.     • vitamin B-6 (PYRIDOXINE) 50 MG tablet Take 1 tablet by mouth Daily.     • vitamin C (ASCORBIC ACID) 250 MG tablet Take 1 tablet by mouth Daily.     • Zinc 50 MG capsule Take 50 mg by mouth Daily.       No current facility-administered medications on file prior to visit.          Which medication are you concerned about: WEGOVY     Who prescribed you this medication: VEL    What are your concerns: PATIENT WOULD LIKE THIS INCREASED TO NEXT DOSAGE AS HE TAKES THE LAST SHOT OF CURRENT DOSAGE ON 04/27/2023

## 2023-06-06 ENCOUNTER — OFFICE VISIT (OUTPATIENT)
Dept: SLEEP MEDICINE | Facility: HOSPITAL | Age: 48
End: 2023-06-06
Payer: COMMERCIAL

## 2023-06-06 ENCOUNTER — OFFICE VISIT (OUTPATIENT)
Dept: INTERNAL MEDICINE | Facility: CLINIC | Age: 48
End: 2023-06-06
Payer: COMMERCIAL

## 2023-06-06 VITALS
WEIGHT: 262 LBS | DIASTOLIC BLOOD PRESSURE: 83 MMHG | OXYGEN SATURATION: 97 % | HEART RATE: 77 BPM | SYSTOLIC BLOOD PRESSURE: 121 MMHG | HEIGHT: 70 IN | BODY MASS INDEX: 37.51 KG/M2

## 2023-06-06 VITALS
DIASTOLIC BLOOD PRESSURE: 89 MMHG | HEART RATE: 81 BPM | OXYGEN SATURATION: 94 % | HEIGHT: 70 IN | SYSTOLIC BLOOD PRESSURE: 122 MMHG | WEIGHT: 263.8 LBS | BODY MASS INDEX: 37.77 KG/M2 | TEMPERATURE: 97.7 F

## 2023-06-06 DIAGNOSIS — G47.8 NON-RESTORATIVE SLEEP: ICD-10-CM

## 2023-06-06 DIAGNOSIS — R06.81 WITNESSED EPISODE OF APNEA: Primary | ICD-10-CM

## 2023-06-06 DIAGNOSIS — E66.09 CLASS 2 OBESITY DUE TO EXCESS CALORIES WITHOUT SERIOUS COMORBIDITY WITH BODY MASS INDEX (BMI) OF 37.0 TO 37.9 IN ADULT: Primary | ICD-10-CM

## 2023-06-06 DIAGNOSIS — E78.2 MIXED HYPERLIPIDEMIA: ICD-10-CM

## 2023-06-06 DIAGNOSIS — G47.10 HYPERSOMNIA: ICD-10-CM

## 2023-06-06 DIAGNOSIS — R06.83 SNORING: ICD-10-CM

## 2023-06-06 PROCEDURE — G0463 HOSPITAL OUTPT CLINIC VISIT: HCPCS

## 2023-06-06 RX ORDER — SEMAGLUTIDE 1.7 MG/.75ML
1.7 INJECTION, SOLUTION SUBCUTANEOUS WEEKLY
Qty: 3 ML | Refills: 0 | Status: SHIPPED | OUTPATIENT
Start: 2023-06-06

## 2023-06-06 RX ORDER — SEMAGLUTIDE 2.4 MG/.75ML
2.4 INJECTION, SOLUTION SUBCUTANEOUS WEEKLY
Qty: 3 ML | Refills: 5 | Status: SHIPPED | OUTPATIENT
Start: 2023-06-06

## 2023-06-06 RX ORDER — ROSUVASTATIN CALCIUM 10 MG/1
10 TABLET, COATED ORAL EVERY 24 HOURS
COMMUNITY

## 2023-06-06 NOTE — PROGRESS NOTES
"CHIEF COMPLAINT/ HPI:  Hyperlipidemia and Follow-up (Patient is here for a routine follow up )    F/u with wgt loss and meds and elevated chol and labs           Objective   Vital Signs  Vitals:    06/06/23 1503   BP: 122/89   Pulse: 81   Temp: 97.7 °F (36.5 °C)   SpO2: 94%   Weight: 120 kg (263 lb 12.8 oz)   Height: 177.8 cm (70\")      Body mass index is 37.85 kg/m².  Review of Systems   Constitutional: Negative.    HENT: Negative.     Eyes: Negative.    Respiratory: Negative.     Cardiovascular: Negative.    Gastrointestinal: Negative.    Endocrine: Negative.    Genitourinary: Negative.    Musculoskeletal: Negative.    Allergic/Immunologic: Negative.    Neurological: Negative.    Hematological: Negative.    Psychiatric/Behavioral: Negative.      Physical Exam  Constitutional:       General: He is not in acute distress.     Appearance: Normal appearance. He is obese.   HENT:      Head: Normocephalic.      Mouth/Throat:      Mouth: Mucous membranes are moist.   Eyes:      Conjunctiva/sclera: Conjunctivae normal.      Pupils: Pupils are equal, round, and reactive to light.   Cardiovascular:      Rate and Rhythm: Normal rate and regular rhythm.      Pulses: Normal pulses.      Heart sounds: Normal heart sounds.   Pulmonary:      Effort: Pulmonary effort is normal.      Breath sounds: Normal breath sounds.   Abdominal:      General: Bowel sounds are normal.      Palpations: Abdomen is soft.   Musculoskeletal:         General: No swelling. Normal range of motion.      Cervical back: Neck supple.   Skin:     General: Skin is warm and dry.      Coloration: Skin is not jaundiced.   Neurological:      General: No focal deficit present.      Mental Status: He is alert and oriented to person, place, and time. Mental status is at baseline.   Psychiatric:         Mood and Affect: Mood normal.         Behavior: Behavior normal.         Thought Content: Thought content normal.         Judgment: Judgment normal.      Result Review " :   Lab Results   Component Value Date     01/15/2021     CMP          2/16/2023    10:54   CMP   Glucose 99    BUN 21    Creatinine 1.10    EGFR 83.8    Sodium 137    Potassium 4.3    Chloride 101    Calcium 9.7    Total Protein 7.3    Albumin 4.6    Globulin 2.7    Total Bilirubin 0.4    Alkaline Phosphatase 56    AST (SGOT) 25    ALT (SGPT) 34    Albumin/Globulin Ratio 1.7    BUN/Creatinine Ratio 19.1    Anion Gap 10.7      CBC w/diff          2/16/2023    10:54   CBC w/Diff   WBC 8.03    RBC 5.17    Hemoglobin 16.5    Hematocrit 47.2    MCV 91.3    MCH 31.9    MCHC 35.0    RDW 13.0    Platelets 217    Neutrophil Rel % 53.7    Immature Granulocyte Rel % 0.4    Lymphocyte Rel % 32.1    Monocyte Rel % 9.8    Eosinophil Rel % 2.6    Basophil Rel % 1.4       Lipid Panel          2/16/2023    10:54   Lipid Panel   Total Cholesterol 134    Triglycerides 212    HDL Cholesterol 38    VLDL Cholesterol 35    LDL Cholesterol  61    LDL/HDL Ratio 1.41       Lab Results   Component Value Date    TSH 1.390 01/27/2022    TSH 0.117 (L) 01/16/2021      Lab Results   Component Value Date    FREET4 0.90 (L) 01/27/2022    FREET4 0.9 01/16/2021         PSA          2/16/2023    10:54   PSA   PSA 0.768                     Visit Diagnoses:    ICD-10-CM ICD-9-CM   1. Class 2 obesity due to excess calories without serious comorbidity with body mass index (BMI) of 37.0 to 37.9 in adult  E66.09 278.00    Z68.37 V85.37   2. Mixed hyperlipidemia  E78.2 272.2       Assessment and Plan   Diagnoses and all orders for this visit:    1. Class 2 obesity due to excess calories without serious comorbidity with body mass index (BMI) of 37.0 to 37.9 in adult (Primary)  -     Hepatitis C antibody; Future  -     Comprehensive Metabolic Panel; Future  -     CBC & Differential; Future  -     Lipid Panel; Future  -     TSH+Free T4; Future    2. Mixed hyperlipidemia  -     Hepatitis C antibody; Future  -     Comprehensive Metabolic Panel; Future  -      CBC & Differential; Future  -     Lipid Panel; Future  -     TSH+Free T4; Future    Other orders  -     Semaglutide-Weight Management (Wegovy) 1.7 MG/0.75ML solution auto-injector; Inject 0.75 mL under the skin into the appropriate area as directed 1 (One) Time Per Week.  Dispense: 3 mL; Refill: 0  -     Semaglutide-Weight Management (Wegovy) 2.4 MG/0.75ML solution auto-injector; Inject 2.4 mg under the skin into the appropriate area as directed 1 (One) Time Per Week.  Dispense: 3 mL; Refill: 5        Obese, doing well with 25 lbs off since march 2023, continuing on Wegovy 1 mg weekly for the past 8 weeks, will increase dose, June 6, 2023    Mixed hyperlipidemia --continues rosuvastatin 10 mg daily,    Sleep issues , has a home sleep study being set up, as of June 6, 2023,     PSA 0.7 February 16, 2023,    Follow Up   Return in about 6 months (around 12/6/2023).  Patient was given instructions and counseling regarding his condition or for health maintenance advice. Please see specific information pulled into the AVS if appropriate.

## 2023-06-06 NOTE — PROGRESS NOTES
[facility]  [address] [place] [id number]  Phone: [phone]  Fax: [phone]      Calvin Nye  [id number]   1975  47 y.o.  male      Referring Provider and PCP: Henrry Robles MD    Type of service: Initial Sleep Medicine Consult.  Date of service: 6/6/2023          CHIEF COMPLAINT: Witnessed apnea      HISTORY OF PRESENT ILLNESS:  Thank you for asking us to see Calvin Nye.  Calvin Nye 47 y.o. was seen today on 6/6/2023 at [facility].  Patient presents today with symptoms of snoring, non-restorative sleep, and witnessed apneas.  The symptoms are chronic and persistent in nature.  Patient has no history of tonsillectomy, adenoidectomy, nasal surgery, UPPP.   Increased fatigue as well since COVID-19 pneumonia in 2021.         SLEEP HISTORY:  Sleep schedule:  Bedtime: 10:30pm   Wake time: 5-5:30am   Normally takes about 10 minutes to fall asleep  Average hours of sleep: 7  Number of naps per day: 0-1    Symptoms:   In addition to the above, patient reports the following associated symptoms:  Have you ever awakened gasping for breath, coughing, choking: Yes   Change in weight:  Yes, gained 30-40lb  Morning headaches:  No   Awaken with a sore throat or dry mouth:  No   Leg jerking at night:  No   Creepy crawly feeling in legs/urge to move legs: No   Teeth grinding: No   Have you ever awakened at night with a sour taste or burning sensation in your chest:  yes   Do you have muscle weakness with laughing or anger:  No   Have you ever felt paralyzed while going to sleep or waking up:  No   Sleepwalking: No   Nightmares: No   Nocturia (urination at night): 0 times per night  Memory Problem: No     Medical Conditions (PMH):   Acid reflux    Social history:  Do you drive a commercial vehicle:  No   Shift work:  No   Tobacco use:  No  Alcohol use: 3 per week  Caffeinated drinks: 0 per day  Occupation:     Family  "History (parents and siblings) (pertaining to sleep medicine):  No relevant    Medications: reviewed    Allergies:  Patient has no known allergies.      REVIEW OF SYSTEMS:  Pertinent positive symptoms are:  Snoring  Witnessed apnea  [place] Sleepiness Scale of Total score: 11   Fatigue         PHYSICAL EXAM:  CONSTITUTINONAL:   Vitals:    06/06/23 1100   BP: 121/83   Pulse: 77   SpO2: 97%   Weight: 119 kg (262 lb)   Height: 177.8 cm (70\")    Body mass index is 37.59 kg/m².   HEAD: atraumatic, normocephalic   NOSE: nasal passages are clear  THROAT: Mallampati class IV  NECK: Neck Circumference: 17 inches, trachea is midline  RESPIRATORY SYSTEM: Breath sounds are normal, breathing unlabored, no wheezing present on exam  CARDIOVASULAR SYSTEM: Regular rhythm and normal rate, no edema  NEUROLOGICAL SYSTEM: Alert and oriented x 3, normal gait  PSYCHIATRIC SYSTEM: Mood is normal/ appropriate     Office notes from care team reviewed. Office note dated 3/1/23, reviewed.            ASSESSMENT AND PLAN:   Witnessed apnea (R06.81): patient's symptoms and physical examination are consistent with sleep apnea (G47.30).   I discussed the signs, symptoms, and pathophysiology of sleep apnea with this patient.  I also discussed the potential complications of untreated sleep apnea including but not limited to resistant hypertension, insulin resistance, pulmonary hypertension, atrial fibrillation, stroke, nonrestorative sleep with hypersomnia which can increase risk for motor vehicle accidents, etc.   Different testing methods including home-based and lab based sleep studies were discussed with this patient.   Based on patient history and physical examination, the most appropriate study is Home sleep study.  The order for the sleep study is placed in [facility].  The test will be scheduled after prior authorization has been obtained through patient's insurance.  Treatment and management will be discussed in more detail with this patient after " the test is completed.  All questions were answered to patient's satisfaction.   Snoring (R06.83): snoring is the sound created by turbulent airflow vibrating upper airway soft tissue.  I have also discussed factors affecting snoring including sleep deprivation, sleeping on the back and alcohol ingestion. To minimize snoring, patient is advised to have adequate sleep, sleep on their side, and avoid alcohol and sedative medications around bedtime.   Excessive daytime sleepiness: [place] Sleepiness Scale of Total score: 11.  There are many causes of excessive daytime sleepiness including but not limited to sleep apnea, shiftwork syndrome, depression, and other medical disorders such as heart disease, kidney disease, and liver failure.  The most serious cause of excessive sleepiness is due to neurological conditions such as narcolepsy/cataplexy.  More commonly excessive sleepiness is caused by sleep apnea with frequent awakenings during sleep time.  I have discussed safety of driving and to remain vigilant while driving.  Obesity: Body mass index is 37.59 kg/m².. Patients who are overweight or obese are at increased risk of sleep apnea/ sleep disordered breathing. Weight reduction and healthy lifestyle are encouraged in overweight/ obese patients as part of a comprehensive approach to sleep apnea treatment.  He is working on healthy weight loss, on Wegovy per PCP  Hyperlipidemia    I have also discussed with the patient the following  Adequate amount of sleep: most people need around 7 to 8 hours of sleep each night        Patient will follow-up after study, 31 to 90 days after PAP therapy initiated if applicable, or contact the office sooner for questions or concerns. Patient's questions were answered.            Thank you again for asking me to consult on this patient.  Please do not hesitate to call me if you have additional questions or concerns.       Samira Hall DNP, APRN  [facility]

## 2023-07-24 ENCOUNTER — OFFICE VISIT (OUTPATIENT)
Dept: SLEEP MEDICINE | Facility: HOSPITAL | Age: 48
End: 2023-07-24
Payer: COMMERCIAL

## 2023-07-24 ENCOUNTER — TELEPHONE (OUTPATIENT)
Dept: SLEEP MEDICINE | Facility: HOSPITAL | Age: 48
End: 2023-07-24
Payer: COMMERCIAL

## 2023-07-24 VITALS
BODY MASS INDEX: 36.48 KG/M2 | WEIGHT: 254.8 LBS | OXYGEN SATURATION: 98 % | DIASTOLIC BLOOD PRESSURE: 82 MMHG | HEART RATE: 86 BPM | HEIGHT: 70 IN | SYSTOLIC BLOOD PRESSURE: 145 MMHG

## 2023-07-24 DIAGNOSIS — G47.33 OBSTRUCTIVE SLEEP APNEA, ADULT: Primary | ICD-10-CM

## 2023-07-24 DIAGNOSIS — E66.09 CLASS 2 OBESITY DUE TO EXCESS CALORIES WITH BODY MASS INDEX (BMI) OF 36.0 TO 36.9 IN ADULT, UNSPECIFIED WHETHER SERIOUS COMORBIDITY PRESENT: ICD-10-CM

## 2023-07-24 PROCEDURE — 99213 OFFICE O/P EST LOW 20 MIN: CPT | Performed by: NURSE PRACTITIONER

## 2023-07-24 PROCEDURE — G0463 HOSPITAL OUTPT CLINIC VISIT: HCPCS

## 2023-07-24 NOTE — PROGRESS NOTES
"  [facility] [address]  Phone: [phone]  Fax: [phone]        SLEEP CLINIC FOLLOW-UP PROGRESS NOTE    Calvin Nye  [id number]   1975  47 y.o.  male      PCP: Henrry Robles MD    DATE OF VISIT: 7/24/2023          CHIEF COMPLAINT: Obstructive sleep apnea    HPI:  This is a 47 y.o. year old patient who presents to the clinic today for the management of obstructive sleep apnea.  Patient had a home sleep study/2023 showing severe obstructive sleep apnea with AHI of 76/hr.   He wanted to follow-up in the office prior to proceeding with CPAP.  He felt he was somewhat restless night of home sleep study.  He usually sleeps on stomach.  We did discuss trying another home sleep study or in lab study but he declines at this time.  Reviewed pathophysiology of obstructive sleep apnea as well as risks of untreated sleep apnea and treatment options.  CPAP would likely be most effective option for him at this time.  He is wanting to proceed.      MEDICATIONS: reviewed     ALLERGIES:  Patient has no known allergies.    SOCIAL HISTORY (habits pertaining to sleep medicine):  History tobacco use: No   History of alcohol use: Not weekly  Caffeine use: 0     REVIEW OF SYSTEMS:   Pertinent positive symptoms are:  [place] Sleepiness Scale :Total score: 7   GERD, occasional        PHYSICAL EXAMINATION:  CONSTITUTIONAL:  Vitals:    07/24/23 1300   BP: 145/82   Pulse: 86   SpO2: 98%   Weight: 116 kg (254 lb 12.8 oz)   Height: 177.8 cm (70\")    Body mass index is 36.56 kg/m².   HEAD: atraumatic, normocephalic  RESP SYSTEM: not in respiratory distress, breathing unlabored  CARDIOVASULAR: normal rate, no edema noted   NEURO: Alert and oriented x 3, mood and affect appeared appropriate        ASSESSMENT AND PLAN:  Severe obstructive Sleep Apnea (G 47.33): Reviewed pathophysiology of obstructive sleep apnea with patient as well as risks of untreated sleep " apnea and treatment options.  Proceed with PAP therapy, per patient preference.  Without adequate treatment of sleep apnea and without good compliance, patient would be at increased risk of hypertension, diabetes mellitus, pulmonary hypertension, atrial fibrillation, stroke, and nonrestorative sleep with hypersomnia which could increase risk of motor vehicle accidents.  Patient will follow-up after he has been on PAP therapy 30 to 90 days, he will call sooner for any issues or concerns at which point we can schedule sooner follow-up.  Obesity: Body mass index is 36.56 kg/m².. Patients who are overweight or obese are at increased risk of sleep apnea/ sleep disordered breathing. Weight reduction and healthy lifestyle are encouraged in overweight/ obese patients as part of a comprehensive approach to sleep apnea treatment.    Elevated blood pressure reading: Discuss further with PCP      Patient will follow-up in 30 to 90 days after being on PAP therapy or follow-up sooner for any issues or concerns.  Patient's questions were answered.          Thank you for allowing me to participate in the care of this patient.     Samira Hall DNP, APRN  [facility]

## 2023-10-09 ENCOUNTER — OFFICE VISIT (OUTPATIENT)
Dept: SLEEP MEDICINE | Facility: HOSPITAL | Age: 48
End: 2023-10-09
Payer: COMMERCIAL

## 2023-10-09 VITALS
HEIGHT: 70 IN | OXYGEN SATURATION: 98 % | DIASTOLIC BLOOD PRESSURE: 75 MMHG | WEIGHT: 248.8 LBS | SYSTOLIC BLOOD PRESSURE: 125 MMHG | BODY MASS INDEX: 35.62 KG/M2 | HEART RATE: 81 BPM

## 2023-10-09 DIAGNOSIS — E66.9 CLASS 2 OBESITY WITH BODY MASS INDEX (BMI) OF 35.0 TO 35.9 IN ADULT, UNSPECIFIED OBESITY TYPE, UNSPECIFIED WHETHER SERIOUS COMORBIDITY PRESENT: ICD-10-CM

## 2023-10-09 DIAGNOSIS — G47.33 OBSTRUCTIVE SLEEP APNEA, ADULT: Primary | ICD-10-CM

## 2023-10-09 PROCEDURE — G0463 HOSPITAL OUTPT CLINIC VISIT: HCPCS | Performed by: NURSE PRACTITIONER

## 2023-10-25 ENCOUNTER — LAB (OUTPATIENT)
Dept: LAB | Facility: HOSPITAL | Age: 48
End: 2023-10-25
Payer: COMMERCIAL

## 2023-10-25 ENCOUNTER — TRANSCRIBE ORDERS (OUTPATIENT)
Dept: LAB | Facility: HOSPITAL | Age: 48
End: 2023-10-25
Payer: COMMERCIAL

## 2023-10-25 DIAGNOSIS — E66.9 OBESITY, UNSPECIFIED CLASSIFICATION, UNSPECIFIED OBESITY TYPE, UNSPECIFIED WHETHER SERIOUS COMORBIDITY PRESENT: ICD-10-CM

## 2023-10-25 DIAGNOSIS — E66.9 OBESITY, UNSPECIFIED CLASSIFICATION, UNSPECIFIED OBESITY TYPE, UNSPECIFIED WHETHER SERIOUS COMORBIDITY PRESENT: Primary | ICD-10-CM

## 2023-10-25 LAB
ALBUMIN SERPL-MCNC: 4.4 G/DL (ref 3.5–5.2)
ALBUMIN/GLOB SERPL: 1.5 G/DL
ALP SERPL-CCNC: 49 U/L (ref 39–117)
ALT SERPL W P-5'-P-CCNC: 26 U/L (ref 1–41)
ANION GAP SERPL CALCULATED.3IONS-SCNC: 9.6 MMOL/L (ref 5–15)
AST SERPL-CCNC: 20 U/L (ref 1–40)
BILIRUB SERPL-MCNC: 0.5 MG/DL (ref 0–1.2)
BUN SERPL-MCNC: 13 MG/DL (ref 6–20)
BUN/CREAT SERPL: 12 (ref 7–25)
CALCIUM SPEC-SCNC: 10 MG/DL (ref 8.6–10.5)
CHLORIDE SERPL-SCNC: 103 MMOL/L (ref 98–107)
CHOLEST SERPL-MCNC: 103 MG/DL (ref 0–200)
CO2 SERPL-SCNC: 25.4 MMOL/L (ref 22–29)
CREAT SERPL-MCNC: 1.08 MG/DL (ref 0.76–1.27)
EGFRCR SERPLBLD CKD-EPI 2021: 85.7 ML/MIN/1.73
GLOBULIN UR ELPH-MCNC: 2.9 GM/DL
GLUCOSE SERPL-MCNC: 88 MG/DL (ref 65–99)
HBA1C MFR BLD: 5 % (ref 4.8–5.6)
HDLC SERPL-MCNC: 38 MG/DL (ref 40–60)
LDLC SERPL CALC-MCNC: 38 MG/DL (ref 0–100)
LDLC/HDLC SERPL: 0.86 {RATIO}
POTASSIUM SERPL-SCNC: 4.5 MMOL/L (ref 3.5–5.2)
PROT SERPL-MCNC: 7.3 G/DL (ref 6–8.5)
SODIUM SERPL-SCNC: 138 MMOL/L (ref 136–145)
T4 FREE SERPL-MCNC: 0.99 NG/DL (ref 0.93–1.7)
TRIGL SERPL-MCNC: 161 MG/DL (ref 0–150)
TSH SERPL DL<=0.05 MIU/L-ACNC: 1.06 UIU/ML (ref 0.27–4.2)
VLDLC SERPL-MCNC: 27 MG/DL (ref 5–40)

## 2023-10-25 PROCEDURE — 80053 COMPREHEN METABOLIC PANEL: CPT

## 2023-10-25 PROCEDURE — 80061 LIPID PANEL: CPT

## 2023-10-25 PROCEDURE — 84443 ASSAY THYROID STIM HORMONE: CPT

## 2023-10-25 PROCEDURE — 36415 COLL VENOUS BLD VENIPUNCTURE: CPT

## 2023-10-25 PROCEDURE — 84439 ASSAY OF FREE THYROXINE: CPT

## 2023-10-25 PROCEDURE — 83036 HEMOGLOBIN GLYCOSYLATED A1C: CPT

## 2023-11-30 ENCOUNTER — LAB (OUTPATIENT)
Dept: LAB | Facility: HOSPITAL | Age: 48
End: 2023-11-30
Payer: COMMERCIAL

## 2023-11-30 DIAGNOSIS — E78.2 MIXED HYPERLIPIDEMIA: ICD-10-CM

## 2023-11-30 DIAGNOSIS — E66.09 CLASS 2 OBESITY DUE TO EXCESS CALORIES WITHOUT SERIOUS COMORBIDITY WITH BODY MASS INDEX (BMI) OF 37.0 TO 37.9 IN ADULT: ICD-10-CM

## 2023-11-30 LAB
ALBUMIN SERPL-MCNC: 4.7 G/DL (ref 3.5–5.2)
ALBUMIN/GLOB SERPL: 2 G/DL
ALP SERPL-CCNC: 49 U/L (ref 39–117)
ALT SERPL W P-5'-P-CCNC: 22 U/L (ref 1–41)
ANION GAP SERPL CALCULATED.3IONS-SCNC: 10 MMOL/L (ref 5–15)
AST SERPL-CCNC: 15 U/L (ref 1–40)
BASOPHILS # BLD AUTO: 0.08 10*3/MM3 (ref 0–0.2)
BASOPHILS NFR BLD AUTO: 1 % (ref 0–1.5)
BILIRUB SERPL-MCNC: 1 MG/DL (ref 0–1.2)
BUN SERPL-MCNC: 17 MG/DL (ref 6–20)
BUN/CREAT SERPL: 14.8 (ref 7–25)
CALCIUM SPEC-SCNC: 9.9 MG/DL (ref 8.6–10.5)
CHLORIDE SERPL-SCNC: 104 MMOL/L (ref 98–107)
CHOLEST SERPL-MCNC: 111 MG/DL (ref 0–200)
CO2 SERPL-SCNC: 25 MMOL/L (ref 22–29)
CREAT SERPL-MCNC: 1.15 MG/DL (ref 0.76–1.27)
DEPRECATED RDW RBC AUTO: 41.8 FL (ref 37–54)
EGFRCR SERPLBLD CKD-EPI 2021: 79.5 ML/MIN/1.73
EOSINOPHIL # BLD AUTO: 0.24 10*3/MM3 (ref 0–0.4)
EOSINOPHIL NFR BLD AUTO: 3.1 % (ref 0.3–6.2)
ERYTHROCYTE [DISTWIDTH] IN BLOOD BY AUTOMATED COUNT: 12.4 % (ref 12.3–15.4)
GLOBULIN UR ELPH-MCNC: 2.4 GM/DL
GLUCOSE SERPL-MCNC: 93 MG/DL (ref 65–99)
HCT VFR BLD AUTO: 48.3 % (ref 37.5–51)
HCV AB SER DONR QL: NORMAL
HDLC SERPL-MCNC: 36 MG/DL (ref 40–60)
HGB BLD-MCNC: 16.7 G/DL (ref 13–17.7)
IMM GRANULOCYTES # BLD AUTO: 0.03 10*3/MM3 (ref 0–0.05)
IMM GRANULOCYTES NFR BLD AUTO: 0.4 % (ref 0–0.5)
LDLC SERPL CALC-MCNC: 51 MG/DL (ref 0–100)
LDLC/HDLC SERPL: 1.31 {RATIO}
LYMPHOCYTES # BLD AUTO: 2.14 10*3/MM3 (ref 0.7–3.1)
LYMPHOCYTES NFR BLD AUTO: 27.6 % (ref 19.6–45.3)
MCH RBC QN AUTO: 31.9 PG (ref 26.6–33)
MCHC RBC AUTO-ENTMCNC: 34.6 G/DL (ref 31.5–35.7)
MCV RBC AUTO: 92.2 FL (ref 79–97)
MONOCYTES # BLD AUTO: 0.75 10*3/MM3 (ref 0.1–0.9)
MONOCYTES NFR BLD AUTO: 9.7 % (ref 5–12)
NEUTROPHILS NFR BLD AUTO: 4.5 10*3/MM3 (ref 1.7–7)
NEUTROPHILS NFR BLD AUTO: 58.2 % (ref 42.7–76)
NRBC BLD AUTO-RTO: 0 /100 WBC (ref 0–0.2)
PLATELET # BLD AUTO: 206 10*3/MM3 (ref 140–450)
PMV BLD AUTO: 10.5 FL (ref 6–12)
POTASSIUM SERPL-SCNC: 4 MMOL/L (ref 3.5–5.2)
PROT SERPL-MCNC: 7.1 G/DL (ref 6–8.5)
RBC # BLD AUTO: 5.24 10*6/MM3 (ref 4.14–5.8)
SODIUM SERPL-SCNC: 139 MMOL/L (ref 136–145)
T4 FREE SERPL-MCNC: 0.98 NG/DL (ref 0.93–1.7)
TRIGL SERPL-MCNC: 139 MG/DL (ref 0–150)
TSH SERPL DL<=0.05 MIU/L-ACNC: 0.96 UIU/ML (ref 0.27–4.2)
VLDLC SERPL-MCNC: 24 MG/DL (ref 5–40)
WBC NRBC COR # BLD AUTO: 7.74 10*3/MM3 (ref 3.4–10.8)

## 2023-11-30 PROCEDURE — 36415 COLL VENOUS BLD VENIPUNCTURE: CPT

## 2023-11-30 PROCEDURE — 80061 LIPID PANEL: CPT

## 2023-11-30 PROCEDURE — 86803 HEPATITIS C AB TEST: CPT

## 2023-11-30 PROCEDURE — 80050 GENERAL HEALTH PANEL: CPT

## 2023-11-30 PROCEDURE — 84439 ASSAY OF FREE THYROXINE: CPT

## 2023-12-06 ENCOUNTER — OFFICE VISIT (OUTPATIENT)
Dept: INTERNAL MEDICINE | Facility: CLINIC | Age: 48
End: 2023-12-06
Payer: COMMERCIAL

## 2023-12-06 VITALS
TEMPERATURE: 98.2 F | OXYGEN SATURATION: 98 % | DIASTOLIC BLOOD PRESSURE: 76 MMHG | SYSTOLIC BLOOD PRESSURE: 106 MMHG | BODY MASS INDEX: 35.36 KG/M2 | HEART RATE: 89 BPM | WEIGHT: 247 LBS | HEIGHT: 70 IN

## 2023-12-06 DIAGNOSIS — E66.09 CLASS 2 OBESITY DUE TO EXCESS CALORIES WITHOUT SERIOUS COMORBIDITY WITH BODY MASS INDEX (BMI) OF 37.0 TO 37.9 IN ADULT: ICD-10-CM

## 2023-12-06 DIAGNOSIS — Z12.5 SCREENING PSA (PROSTATE SPECIFIC ANTIGEN): ICD-10-CM

## 2023-12-06 DIAGNOSIS — D75.1 POLYCYTHEMIA: ICD-10-CM

## 2023-12-06 DIAGNOSIS — E78.2 MIXED HYPERLIPIDEMIA: Primary | ICD-10-CM

## 2023-12-06 DIAGNOSIS — G47.33 OBSTRUCTIVE SLEEP APNEA, ADULT: ICD-10-CM

## 2023-12-06 RX ORDER — UBIDECARENONE 50 MG
50 CAPSULE ORAL DAILY
COMMUNITY

## 2023-12-06 RX ORDER — MULTIPLE VITAMINS W/ MINERALS TAB 9MG-400MCG
1 TAB ORAL DAILY
COMMUNITY

## 2023-12-06 NOTE — PROGRESS NOTES
"CHIEF COMPLAINT/ HPI:  Hyperlipidemia (Routine follow up, Lab follow up. Pt states  no concerns, decline flu shot. )    F/u with wgt loss efforts and use of wegovy,  And recent lab to review           Objective   Vital Signs  Vitals:    12/06/23 1459   BP: 106/76   Pulse: 89   Temp: 98.2 °F (36.8 °C)   SpO2: 98%   Weight: 112 kg (247 lb)   Height: 177.8 cm (70\")      Body mass index is 35.44 kg/m².  Review of Systems   Constitutional: Negative.    HENT: Negative.     Eyes: Negative.    Respiratory: Negative.     Cardiovascular: Negative.    Gastrointestinal: Negative.    Endocrine: Negative.    Genitourinary: Negative.    Musculoskeletal: Negative.    Allergic/Immunologic: Negative.    Neurological: Negative.    Hematological: Negative.    Psychiatric/Behavioral: Negative.        Physical Exam  Constitutional:       General: He is not in acute distress.     Appearance: Normal appearance. He is obese.   HENT:      Head: Normocephalic.      Mouth/Throat:      Mouth: Mucous membranes are moist.   Eyes:      Conjunctiva/sclera: Conjunctivae normal.      Pupils: Pupils are equal, round, and reactive to light.   Cardiovascular:      Rate and Rhythm: Normal rate and regular rhythm.      Pulses: Normal pulses.      Heart sounds: Normal heart sounds.   Pulmonary:      Effort: Pulmonary effort is normal.      Breath sounds: Normal breath sounds.   Abdominal:      General: Bowel sounds are normal.      Palpations: Abdomen is soft.   Musculoskeletal:         General: No swelling. Normal range of motion.      Cervical back: Neck supple.   Skin:     General: Skin is warm and dry.      Coloration: Skin is not jaundiced.   Neurological:      General: No focal deficit present.      Mental Status: He is alert and oriented to person, place, and time. Mental status is at baseline.   Psychiatric:         Mood and Affect: Mood normal.         Behavior: Behavior normal.         Thought Content: Thought content normal.         Judgment: " Judgment normal.        Result Review :   Lab Results   Component Value Date     01/15/2021     CMP          2/16/2023    10:54 10/25/2023    10:42 11/30/2023    10:59   CMP   Glucose 99  88  93    BUN 21  13  17    Creatinine 1.10  1.08  1.15    EGFR 83.8  85.7  79.5    Sodium 137  138  139    Potassium 4.3  4.5  4.0    Chloride 101  103  104    Calcium 9.7  10.0  9.9    Total Protein 7.3  7.3  7.1    Albumin 4.6  4.4  4.7    Globulin 2.7  2.9  2.4    Total Bilirubin 0.4  0.5  1.0    Alkaline Phosphatase 56  49  49    AST (SGOT) 25  20  15    ALT (SGPT) 34  26  22    Albumin/Globulin Ratio 1.7  1.5  2.0    BUN/Creatinine Ratio 19.1  12.0  14.8    Anion Gap 10.7  9.6  10.0      CBC w/diff          2/16/2023    10:54 11/30/2023    10:59   CBC w/Diff   WBC 8.03  7.74    RBC 5.17  5.24    Hemoglobin 16.5  16.7    Hematocrit 47.2  48.3    MCV 91.3  92.2    MCH 31.9  31.9    MCHC 35.0  34.6    RDW 13.0  12.4    Platelets 217  206    Neutrophil Rel % 53.7  58.2    Immature Granulocyte Rel % 0.4  0.4    Lymphocyte Rel % 32.1  27.6    Monocyte Rel % 9.8  9.7    Eosinophil Rel % 2.6  3.1    Basophil Rel % 1.4  1.0       Lipid Panel          2/16/2023    10:54 10/25/2023    10:42 11/30/2023    10:59   Lipid Panel   Total Cholesterol 134  103  111    Triglycerides 212  161  139    HDL Cholesterol 38  38  36    VLDL Cholesterol 35  27  24    LDL Cholesterol  61  38  51    LDL/HDL Ratio 1.41  0.86  1.31       Lab Results   Component Value Date    TSH 0.961 11/30/2023    TSH 1.060 10/25/2023    TSH 1.390 01/27/2022      Lab Results   Component Value Date    FREET4 0.98 11/30/2023    FREET4 0.99 10/25/2023    FREET4 0.90 (L) 01/27/2022      A1C Last 3 Results          10/25/2023    10:42   HGBA1C Last 3 Results   Hemoglobin A1C 5.00       PSA          2/16/2023    10:54   PSA   PSA 0.768                     Visit Diagnoses:    ICD-10-CM ICD-9-CM   1. Mixed hyperlipidemia  E78.2 272.2   2. Polycythemia  D75.1 238.4   3.  Class 2 obesity due to excess calories without serious comorbidity with body mass index (BMI) of 37.0 to 37.9 in adult  E66.09 278.00    Z68.37 V85.37   4. Screening PSA (prostate specific antigen)  Z12.5 V76.44   5. Obstructive sleep apnea, adult  G47.33 327.23       Assessment and Plan   Diagnoses and all orders for this visit:    1. Mixed hyperlipidemia (Primary)  -     CBC & Differential; Future  -     Comprehensive Metabolic Panel; Future  -     Lipid Panel; Future  -     PSA Screen; Future  -     TSH+Free T4; Future    2. Polycythemia  -     CBC & Differential; Future  -     Comprehensive Metabolic Panel; Future  -     Lipid Panel; Future  -     PSA Screen; Future  -     TSH+Free T4; Future    3. Class 2 obesity due to excess calories without serious comorbidity with body mass index (BMI) of 37.0 to 37.9 in adult  -     CBC & Differential; Future  -     Comprehensive Metabolic Panel; Future  -     Lipid Panel; Future  -     PSA Screen; Future  -     TSH+Free T4; Future    4. Screening PSA (prostate specific antigen)  -     CBC & Differential; Future  -     Comprehensive Metabolic Panel; Future  -     Lipid Panel; Future  -     PSA Screen; Future  -     TSH+Free T4; Future    5. Obstructive sleep apnea, adult  -     CBC & Differential; Future  -     Comprehensive Metabolic Panel; Future  -     Lipid Panel; Future  -     PSA Screen; Future  -     TSH+Free T4; Future    Obese, doing well with 25 lbs off since march 2023, continuing on Wegovy 2.4 mg q week --hemoglobin A1c done through the insurance--rec cardiovascular aerobic , --consideration for changing to zepbound, and /or wellbutrin     Mixed hyperlipidemia --continues rosuvastatin 10 mg daily,    Sleep issues , home sleep study June 6, 2023,     PSA 0.7 February 16, 2023,    Follow Up   Return in about 6 months (around 6/6/2024).  Patient was given instructions and counseling regarding his condition or for health maintenance advice. Please see specific  information pulled into the AVS if appropriate.

## 2023-12-19 ENCOUNTER — TELEPHONE (OUTPATIENT)
Dept: INTERNAL MEDICINE | Facility: CLINIC | Age: 48
End: 2023-12-19
Payer: COMMERCIAL

## 2023-12-19 RX ORDER — BUPROPION HYDROCHLORIDE 150 MG/1
150 TABLET ORAL DAILY
Qty: 30 TABLET | Refills: 5 | Status: SHIPPED | OUTPATIENT
Start: 2023-12-19

## 2023-12-19 NOTE — TELEPHONE ENCOUNTER
Caller: Calvin Nye    Relationship: Self    Best call back number: [phone]    What medication are you requesting: WELLBUTRIN     What are your current symptoms: ADD WITH WEGOVY FOR WEIGHTLOSS    How long have you been experiencing symptoms: N/A    Have you had these symptoms before:    [] Yes  [x] No    Have you been treated for these symptoms before:   [] Yes  [x] No    If a prescription is needed, what is your preferred pharmacy and phone number: [facility]/PHARMACY #[id number] - [place] - [address] [facility] [phone] [facility] [phone] FX     Additional notes: PATIENT STATES AT HIS LAST APPOINTMENT THEY HAD DISCUSSED ADDING WELLBUTRIN TO HELP WITH HIS WEIGHT LOSS AND HE WOULD LIKE TO KNOW IF IT CAN BE CALLED IN.

## 2023-12-21 RX ORDER — SEMAGLUTIDE 2.4 MG/.75ML
2.4 INJECTION, SOLUTION SUBCUTANEOUS WEEKLY
Qty: 3 ML | Refills: 5 | Status: SHIPPED | OUTPATIENT
Start: 2023-12-21

## 2024-01-25 RX ORDER — SEMAGLUTIDE 1.7 MG/.75ML
1.7 INJECTION, SOLUTION SUBCUTANEOUS WEEKLY
OUTPATIENT
Start: 2024-01-25

## 2024-01-26 RX ORDER — SEMAGLUTIDE 2.4 MG/.75ML
2.4 INJECTION, SOLUTION SUBCUTANEOUS WEEKLY
Qty: 3 ML | Refills: 5 | OUTPATIENT
Start: 2024-01-26

## 2024-01-26 NOTE — TELEPHONE ENCOUNTER
Caller: Calvin Nye    Relationship: Self    Best call back number:     [phone]        Requested Prescriptions:   Requested Prescriptions     Pending Prescriptions Disp Refills    Semaglutide-Weight Management (Wegovy) 2.4 MG/0.75ML solution auto-injector 3 mL 5     Sig: Inject 2.4 mg under the skin into the appropriate area as directed 1 (One) Time Per Week.        Pharmacy where request should be sent: [facility]/PHARMACY #[id number] - [place], [place] - [address] - [phone] [facility] [phone] FX     Last office visit with prescribing clinician: 12/6/2023   Last telemedicine visit with prescribing clinician: Visit date not found   Next office visit with prescribing clinician: 6/6/2024     Additional details provided by patient: PATIENT IS OUT OF THIS MEDICATION AT THIS TIME AND DID NOT HAVE IT TO TAKE HIS DOSE LAST NIGHT.     Does the patient have less than a 3 day supply:  [] Yes  [] No    Would you like a call back once the refill request has been completed: [] Yes [] No    If the office needs to give you a call back, can they leave a voicemail: [] Yes [] No    [employer] Mg Rep   01/26/24 14:51 EST

## 2024-03-11 ENCOUNTER — TELEPHONE (OUTPATIENT)
Dept: INTERNAL MEDICINE | Age: 49
End: 2024-03-11
Payer: COMMERCIAL

## 2024-03-11 RX ORDER — BUPROPION HYDROCHLORIDE 150 MG/1
150 TABLET ORAL DAILY
Qty: 30 TABLET | Refills: 5 | Status: SHIPPED | OUTPATIENT
Start: 2024-03-11

## 2024-04-15 ENCOUNTER — OFFICE VISIT (OUTPATIENT)
Dept: SLEEP MEDICINE | Facility: HOSPITAL | Age: 49
End: 2024-04-15
Payer: COMMERCIAL

## 2024-04-15 VITALS
HEART RATE: 88 BPM | DIASTOLIC BLOOD PRESSURE: 71 MMHG | HEIGHT: 70 IN | OXYGEN SATURATION: 98 % | SYSTOLIC BLOOD PRESSURE: 110 MMHG | WEIGHT: 248 LBS | BODY MASS INDEX: 35.5 KG/M2

## 2024-04-15 DIAGNOSIS — E66.01 CLASS 2 SEVERE OBESITY WITH SERIOUS COMORBIDITY AND BODY MASS INDEX (BMI) OF 35.0 TO 35.9 IN ADULT, UNSPECIFIED OBESITY TYPE: ICD-10-CM

## 2024-04-15 DIAGNOSIS — G47.33 SEVERE OBSTRUCTIVE SLEEP APNEA: Primary | ICD-10-CM

## 2024-04-15 PROCEDURE — 99214 OFFICE O/P EST MOD 30 MIN: CPT | Performed by: NURSE PRACTITIONER

## 2024-04-15 PROCEDURE — G0463 HOSPITAL OUTPT CLINIC VISIT: HCPCS

## 2024-04-15 NOTE — PROGRESS NOTES
"  [address] [address]  Phone: [phone]  Fax: [phone]        SLEEP CLINIC FOLLOW-UP PROGRESS NOTE    Calvin Nye  [id number]   1975  48 y.o.  male      PCP: Henrry Robles MD    DATE OF VISIT: 4/15/2024          CHIEF COMPLAINT: Obstructive sleep apnea    HPI:  This is a 48 y.o. year old patient who presents to the clinic today for the management of obstructive sleep apnea. Patient had a home sleep study 6/2023 showing severe obstructive sleep apnea with AHI of 76/hr.  This patient is using positive airway pressure therapy with auto CPAP. Patient's sleep apnea has improved with this therapy, residual AHI 1/h with good usage of device.  Patient usually goes to bed around 1030-11 PM and wakes up around 6 AM.  He is feeling more tired today than normal.  Overall does notice improvement with PAP.      MEDICATIONS: reviewed     ALLERGIES:  Patient has no known allergies.    SOCIAL HISTORY (habits pertaining to sleep medicine):  Tobacco use: No   Alcohol use: A few, occasionally  Caffeine use: 0     REVIEW OF SYSTEMS:   Pertinent positive symptoms are:  Mumford Sleepiness Scale :Total score: 10 --- today is not his average sleepiness level, usually less sleepy than today           PHYSICAL EXAMINATION:  CONSTITUTIONAL:  Vitals:    04/15/24 1300   BP: 110/71   Pulse: 88   SpO2: 98%   Weight: 112 kg (248 lb)   Height: 177.8 cm (70\")    Body mass index is 35.58 kg/m².   HEAD: atraumatic, normocephalic  RESP SYSTEM: not in respiratory distress, breathing unlabored  CARDIOVASULAR: normal rate, no edema noted   NEURO: Alert and oriented x 3, mood and affect appeared appropriate      DATA REVIEWED:  The PAP compliance summary downloaded on 4/7/2024 has been reviewed independently by me and discussed with the patient.   Compliance: 100%  More than 4 hr use: 100%  Average use of the device: 6 hours 46 minutes per night  Residual AHI: 1/hr " (goal < 5.0 /hr)  Device: ResMed AirSense 11  Mask type: Nasal  DME: [employer]          ASSESSMENT AND PLAN:  Obstructive Sleep Apnea: sleep apnea has improved with the device and treatment.  Patient has excellent compliance with the device for treatment of sleep apnea.  I have personally reviewed the smart card download and discussed the download data with the patient and encouarged continued use of the device.  The residual AHI is acceptable. The device is benefiting the patient and the device is medically necessary.  Recommend patient get supplies from the DME company, change them on a regular basis, and clean as directed.  A prescription for supplies has been sent to the [employer] company.  Also recommend using CPAP a minimum of 4 hours per night to meet insurance criteria, all night every night recommended for optimum health.  Recommend prioritizing sleep to aid in overall health.  Obesity: Body mass index is 35.58 kg/m².. Patients who are overweight or obese are at increased risk of sleep apnea/ sleep disordered breathing. Weight reduction and healthy lifestyle are encouraged in overweight/ obese patients as part of a comprehensive approach to sleep apnea treatment.    Fatigue: Do not drive or operate heavy machinery or do activities that require high concentration if feeling tired/drowsy.  Check overnight oximetry study on room air with CPAP to ensure resolved with treatment of sleep apnea.  Prioritize sleep, we discussed most people need 7 to 8 hours of sleep per night.  He reports he is usually not as tired as he was today.  Recommend addressing further with PCP and contact our office if symptoms persist.      Patient will follow-up in 1 year, per patient preference, or follow-up sooner for any issues or concerns.  Patient's questions were answered.          Thank you for allowing me to participate in the care of this patient.     Samira Hall, PRISCILLA, APRN  [facility]

## 2024-04-16 ENCOUNTER — TELEPHONE (OUTPATIENT)
Dept: INTERNAL MEDICINE | Age: 49
End: 2024-04-16
Payer: COMMERCIAL

## 2024-04-16 RX ORDER — ROSUVASTATIN CALCIUM 10 MG/1
10 TABLET, COATED ORAL EVERY 24 HOURS
Qty: 90 TABLET | Refills: 1 | Status: SHIPPED | OUTPATIENT
Start: 2024-04-16

## 2024-05-06 ENCOUNTER — TELEPHONE (OUTPATIENT)
Dept: SLEEP MEDICINE | Facility: HOSPITAL | Age: 49
End: 2024-05-06
Payer: COMMERCIAL

## 2024-05-28 ENCOUNTER — LAB (OUTPATIENT)
Dept: LAB | Facility: HOSPITAL | Age: 49
End: 2024-05-28
Payer: COMMERCIAL

## 2024-05-28 DIAGNOSIS — G47.33 OBSTRUCTIVE SLEEP APNEA, ADULT: ICD-10-CM

## 2024-05-28 DIAGNOSIS — Z12.5 SCREENING PSA (PROSTATE SPECIFIC ANTIGEN): ICD-10-CM

## 2024-05-28 DIAGNOSIS — E66.09 CLASS 2 OBESITY DUE TO EXCESS CALORIES WITHOUT SERIOUS COMORBIDITY WITH BODY MASS INDEX (BMI) OF 37.0 TO 37.9 IN ADULT: ICD-10-CM

## 2024-05-28 DIAGNOSIS — D75.1 POLYCYTHEMIA: ICD-10-CM

## 2024-05-28 DIAGNOSIS — E78.2 MIXED HYPERLIPIDEMIA: ICD-10-CM

## 2024-05-28 DIAGNOSIS — Z00.00 PHYSICAL EXAM, ANNUAL: ICD-10-CM

## 2024-05-28 LAB
ALBUMIN SERPL-MCNC: 4.3 G/DL (ref 3.5–5.2)
ALBUMIN/GLOB SERPL: 1.7 G/DL
ALP SERPL-CCNC: 47 U/L (ref 39–117)
ALT SERPL W P-5'-P-CCNC: 21 U/L (ref 1–41)
ANION GAP SERPL CALCULATED.3IONS-SCNC: 8.9 MMOL/L (ref 5–15)
AST SERPL-CCNC: 15 U/L (ref 1–40)
BASOPHILS # BLD AUTO: 0.07 10*3/MM3 (ref 0–0.2)
BASOPHILS NFR BLD AUTO: 1 % (ref 0–1.5)
BILIRUB SERPL-MCNC: 0.5 MG/DL (ref 0–1.2)
BUN SERPL-MCNC: 18 MG/DL (ref 6–20)
BUN/CREAT SERPL: 16.2 (ref 7–25)
CALCIUM SPEC-SCNC: 9.3 MG/DL (ref 8.6–10.5)
CHLORIDE SERPL-SCNC: 107 MMOL/L (ref 98–107)
CHOLEST SERPL-MCNC: 102 MG/DL (ref 0–200)
CO2 SERPL-SCNC: 23.1 MMOL/L (ref 22–29)
CREAT SERPL-MCNC: 1.11 MG/DL (ref 0.76–1.27)
DEPRECATED RDW RBC AUTO: 41.7 FL (ref 37–54)
EGFRCR SERPLBLD CKD-EPI 2021: 82.4 ML/MIN/1.73
EOSINOPHIL # BLD AUTO: 0.42 10*3/MM3 (ref 0–0.4)
EOSINOPHIL NFR BLD AUTO: 5.9 % (ref 0.3–6.2)
ERYTHROCYTE [DISTWIDTH] IN BLOOD BY AUTOMATED COUNT: 12.2 % (ref 12.3–15.4)
GLOBULIN UR ELPH-MCNC: 2.6 GM/DL
GLUCOSE SERPL-MCNC: 94 MG/DL (ref 65–99)
HCT VFR BLD AUTO: 47 % (ref 37.5–51)
HDLC SERPL-MCNC: 36 MG/DL (ref 40–60)
HGB BLD-MCNC: 16.3 G/DL (ref 13–17.7)
IMM GRANULOCYTES # BLD AUTO: 0.02 10*3/MM3 (ref 0–0.05)
IMM GRANULOCYTES NFR BLD AUTO: 0.3 % (ref 0–0.5)
LDLC SERPL CALC-MCNC: 39 MG/DL (ref 0–100)
LDLC/HDLC SERPL: 0.94 {RATIO}
LYMPHOCYTES # BLD AUTO: 2.24 10*3/MM3 (ref 0.7–3.1)
LYMPHOCYTES NFR BLD AUTO: 31.4 % (ref 19.6–45.3)
MCH RBC QN AUTO: 32 PG (ref 26.6–33)
MCHC RBC AUTO-ENTMCNC: 34.7 G/DL (ref 31.5–35.7)
MCV RBC AUTO: 92.2 FL (ref 79–97)
MONOCYTES # BLD AUTO: 0.66 10*3/MM3 (ref 0.1–0.9)
MONOCYTES NFR BLD AUTO: 9.2 % (ref 5–12)
NEUTROPHILS NFR BLD AUTO: 3.73 10*3/MM3 (ref 1.7–7)
NEUTROPHILS NFR BLD AUTO: 52.2 % (ref 42.7–76)
NRBC BLD AUTO-RTO: 0 /100 WBC (ref 0–0.2)
PLATELET # BLD AUTO: 185 10*3/MM3 (ref 140–450)
PMV BLD AUTO: 10.4 FL (ref 6–12)
POTASSIUM SERPL-SCNC: 4.4 MMOL/L (ref 3.5–5.2)
PROT SERPL-MCNC: 6.9 G/DL (ref 6–8.5)
PSA SERPL-MCNC: 0.46 NG/ML (ref 0–4)
RBC # BLD AUTO: 5.1 10*6/MM3 (ref 4.14–5.8)
SODIUM SERPL-SCNC: 139 MMOL/L (ref 136–145)
T4 FREE SERPL-MCNC: 0.9 NG/DL (ref 0.93–1.7)
TRIGL SERPL-MCNC: 160 MG/DL (ref 0–150)
TSH SERPL DL<=0.05 MIU/L-ACNC: 0.94 UIU/ML (ref 0.27–4.2)
VLDLC SERPL-MCNC: 27 MG/DL (ref 5–40)
WBC NRBC COR # BLD AUTO: 7.14 10*3/MM3 (ref 3.4–10.8)

## 2024-05-28 PROCEDURE — 80061 LIPID PANEL: CPT

## 2024-05-28 PROCEDURE — 80050 GENERAL HEALTH PANEL: CPT

## 2024-05-28 PROCEDURE — 36415 COLL VENOUS BLD VENIPUNCTURE: CPT

## 2024-05-28 PROCEDURE — G0103 PSA SCREENING: HCPCS

## 2024-05-28 PROCEDURE — 84439 ASSAY OF FREE THYROXINE: CPT

## 2024-06-06 ENCOUNTER — OFFICE VISIT (OUTPATIENT)
Dept: INTERNAL MEDICINE | Age: 49
End: 2024-06-06
Payer: COMMERCIAL

## 2024-06-06 VITALS
HEIGHT: 70 IN | WEIGHT: 247 LBS | HEART RATE: 93 BPM | TEMPERATURE: 98.4 F | BODY MASS INDEX: 35.36 KG/M2 | OXYGEN SATURATION: 98 %

## 2024-06-06 DIAGNOSIS — G47.33 OBSTRUCTIVE SLEEP APNEA, ADULT: ICD-10-CM

## 2024-06-06 DIAGNOSIS — D75.1 POLYCYTHEMIA: ICD-10-CM

## 2024-06-06 DIAGNOSIS — E78.2 MIXED HYPERLIPIDEMIA: ICD-10-CM

## 2024-06-06 DIAGNOSIS — Z12.5 SCREENING PSA (PROSTATE SPECIFIC ANTIGEN): ICD-10-CM

## 2024-06-06 DIAGNOSIS — Z00.00 PHYSICAL EXAM, ANNUAL: Primary | ICD-10-CM

## 2024-06-06 DIAGNOSIS — E66.09 CLASS 2 OBESITY DUE TO EXCESS CALORIES WITHOUT SERIOUS COMORBIDITY WITH BODY MASS INDEX (BMI) OF 37.0 TO 37.9 IN ADULT: ICD-10-CM

## 2024-06-06 PROCEDURE — 99396 PREV VISIT EST AGE 40-64: CPT | Performed by: INTERNAL MEDICINE

## 2024-06-06 RX ORDER — ASPIRIN 81 MG/1
81 TABLET ORAL DAILY
COMMUNITY

## 2024-06-06 NOTE — PROGRESS NOTES
"CHIEF COMPLAINT/ HPI:  Hyperlipidemia (Routine follow up. Lab follow up. Would like to discuss wegovy plateau. )              Objective   Vital Signs  Vitals:    06/06/24 1353   Pulse: 93   Temp: 98.4 °F (36.9 °C)   SpO2: 98%   Weight: 112 kg (247 lb)   Height: 177.8 cm (70\")      Body mass index is 35.44 kg/m².  Review of Systems   Constitutional: Negative.    HENT: Negative.     Eyes: Negative.    Respiratory: Negative.     Cardiovascular: Negative.    Gastrointestinal: Negative.    Endocrine: Negative.    Genitourinary: Negative.    Musculoskeletal: Negative.    Allergic/Immunologic: Negative.    Neurological: Negative.    Hematological: Negative.    Psychiatric/Behavioral: Negative.        Physical Exam  Constitutional:       General: He is not in acute distress.     Appearance: Normal appearance.   HENT:      Head: Normocephalic.      Mouth/Throat:      Mouth: Mucous membranes are moist.   Eyes:      Conjunctiva/sclera: Conjunctivae normal.      Pupils: Pupils are equal, round, and reactive to light.   Cardiovascular:      Rate and Rhythm: Normal rate and regular rhythm.      Pulses: Normal pulses.      Heart sounds: Normal heart sounds.   Pulmonary:      Effort: Pulmonary effort is normal.      Breath sounds: Normal breath sounds.   Abdominal:      General: Bowel sounds are normal.      Palpations: Abdomen is soft.   Musculoskeletal:         General: No swelling. Normal range of motion.      Cervical back: Neck supple.   Skin:     General: Skin is warm and dry.      Coloration: Skin is not jaundiced.   Neurological:      General: No focal deficit present.      Mental Status: He is alert and oriented to person, place, and time. Mental status is at baseline.   Psychiatric:         Mood and Affect: Mood normal.         Behavior: Behavior normal.         Thought Content: Thought content normal.         Judgment: Judgment normal.        Result Review :   Lab Results   Component Value Date     01/15/2021 "     CMP          10/25/2023    10:42 11/30/2023    10:59 5/28/2024    09:14   CMP   Glucose 88  93  94    BUN 13  17  18    Creatinine 1.08  1.15  1.11    EGFR 85.7  79.5  82.4    Sodium 138  139  139    Potassium 4.5  4.0  4.4    Chloride 103  104  107    Calcium 10.0  9.9  9.3    Total Protein 7.3  7.1  6.9    Albumin 4.4  4.7  4.3    Globulin 2.9  2.4  2.6    Total Bilirubin 0.5  1.0  0.5    Alkaline Phosphatase 49  49  47    AST (SGOT) 20  15  15    ALT (SGPT) 26  22  21    Albumin/Globulin Ratio 1.5  2.0  1.7    BUN/Creatinine Ratio 12.0  14.8  16.2    Anion Gap 9.6  10.0  8.9      CBC w/diff          11/30/2023    10:59 5/28/2024    09:14   CBC w/Diff   WBC 7.74  7.14    RBC 5.24  5.10    Hemoglobin 16.7  16.3    Hematocrit 48.3  47.0    MCV 92.2  92.2    MCH 31.9  32.0    MCHC 34.6  34.7    RDW 12.4  12.2    Platelets 206  185    Neutrophil Rel % 58.2  52.2    Immature Granulocyte Rel % 0.4  0.3    Lymphocyte Rel % 27.6  31.4    Monocyte Rel % 9.7  9.2    Eosinophil Rel % 3.1  5.9    Basophil Rel % 1.0  1.0       Lipid Panel          10/25/2023    10:42 11/30/2023    10:59 5/28/2024    09:14   Lipid Panel   Total Cholesterol 103  111  102    Triglycerides 161  139  160    HDL Cholesterol 38  36  36    VLDL Cholesterol 27  24  27    LDL Cholesterol  38  51  39    LDL/HDL Ratio 0.86  1.31  0.94       Lab Results   Component Value Date    TSH 0.936 05/28/2024    TSH 0.961 11/30/2023    TSH 1.060 10/25/2023      Lab Results   Component Value Date    FREET4 0.90 (L) 05/28/2024    FREET4 0.98 11/30/2023    FREET4 0.99 10/25/2023      A1C Last 3 Results          10/25/2023    10:42   HGBA1C Last 3 Results   Hemoglobin A1C 5.00       PSA          5/28/2024    09:14   PSA   PSA 0.463                     Visit Diagnoses:    ICD-10-CM ICD-9-CM   1. Physical exam, annual  Z00.00 V70.0   2. Screening PSA (prostate specific antigen)  Z12.5 V76.44   3. Mixed hyperlipidemia  E78.2 272.2   4. Polycythemia  D75.1 238.4   5.  Obstructive sleep apnea, adult  G47.33 327.23   6. Class 2 obesity due to excess calories without serious comorbidity with body mass index (BMI) of 37.0 to 37.9 in adult  E66.09 278.00    Z68.37 V85.37       Assessment and Plan   Diagnoses and all orders for this visit:    1. Physical exam, annual (Primary)  -     Tirzepatide-Weight Management (ZEPBOUND) 15 MG/0.5ML solution auto-injector; Inject 0.5 mL under the skin into the appropriate area as directed 1 (One) Time Per Week.  Dispense: 2 mL; Refill: 5  -     Comprehensive Metabolic Panel; Future  -     CBC & Differential; Future  -     Lipid Panel; Future  -     Hemoglobin A1c; Future    2. Screening PSA (prostate specific antigen)  -     Tirzepatide-Weight Management (ZEPBOUND) 15 MG/0.5ML solution auto-injector; Inject 0.5 mL under the skin into the appropriate area as directed 1 (One) Time Per Week.  Dispense: 2 mL; Refill: 5  -     Comprehensive Metabolic Panel; Future  -     CBC & Differential; Future  -     Lipid Panel; Future  -     Hemoglobin A1c; Future    3. Mixed hyperlipidemia  -     Tirzepatide-Weight Management (ZEPBOUND) 15 MG/0.5ML solution auto-injector; Inject 0.5 mL under the skin into the appropriate area as directed 1 (One) Time Per Week.  Dispense: 2 mL; Refill: 5  -     Comprehensive Metabolic Panel; Future  -     CBC & Differential; Future  -     Lipid Panel; Future  -     Hemoglobin A1c; Future    4. Polycythemia  -     Tirzepatide-Weight Management (ZEPBOUND) 15 MG/0.5ML solution auto-injector; Inject 0.5 mL under the skin into the appropriate area as directed 1 (One) Time Per Week.  Dispense: 2 mL; Refill: 5  -     Comprehensive Metabolic Panel; Future  -     CBC & Differential; Future  -     Lipid Panel; Future  -     Hemoglobin A1c; Future    5. Obstructive sleep apnea, adult  -     Tirzepatide-Weight Management (ZEPBOUND) 15 MG/0.5ML solution auto-injector; Inject 0.5 mL under the skin into the appropriate area as directed 1 (One)  Time Per Week.  Dispense: 2 mL; Refill: 5  -     Comprehensive Metabolic Panel; Future  -     CBC & Differential; Future  -     Lipid Panel; Future  -     Hemoglobin A1c; Future    6. Class 2 obesity due to excess calories without serious comorbidity with body mass index (BMI) of 37.0 to 37.9 in adult  -     Tirzepatide-Weight Management (ZEPBOUND) 15 MG/0.5ML solution auto-injector; Inject 0.5 mL under the skin into the appropriate area as directed 1 (One) Time Per Week.  Dispense: 2 mL; Refill: 5  -     Comprehensive Metabolic Panel; Future  -     CBC & Differential; Future  -     Lipid Panel; Future  -     Hemoglobin A1c; Future      Annual exam, June 6, 2024 , ---preventitive exam --wears seatbelts --, patient does not smoke, only drinks socially, wears a seatbelt, encouraged him to continue aerobic exercise continue weight loss efforts,    Obese,---------- doing well with 25 lbs off since march 2023, continuing on Wegovy 2.4 mg q week --hemoglobin A1c done through the insurance--rec cardiovascular aerobic , --consideration for changing to zepbound, and /or wellbutrin --- tried Wellbutrin did not really change much so he stopped it,---- patient's been struggling with his weight a little bit with the Wegovy 2.4, consider switching to zepbound 15 mg weekly dose, new prescription sent in June 6, 2024    Mixed hyperlipidemia --continues rosuvastatin 10 mg daily,    Sleep issues , home sleep study June 6, 2023, ===cont cpap ---    PSA 0.46 may 2024       Follow Up   Return in about 6 months (around 12/6/2024).  Patient was given instructions and counseling regarding his condition or for health maintenance advice. Please see specific information pulled into the AVS if appropriate.

## 2024-06-07 ENCOUNTER — TRANSCRIBE ORDERS (OUTPATIENT)
Dept: LAB | Facility: HOSPITAL | Age: 49
End: 2024-06-07
Payer: COMMERCIAL

## 2024-06-07 DIAGNOSIS — E66.3 OVER WEIGHT: Primary | ICD-10-CM

## 2024-06-10 RX ORDER — SEMAGLUTIDE 2.4 MG/.75ML
2.4 INJECTION, SOLUTION SUBCUTANEOUS WEEKLY
Refills: 5 | OUTPATIENT
Start: 2024-06-10

## 2024-10-11 RX ORDER — ROSUVASTATIN CALCIUM 10 MG/1
10 TABLET, COATED ORAL DAILY
Qty: 90 TABLET | Refills: 1 | Status: SHIPPED | OUTPATIENT
Start: 2024-10-11

## 2024-10-28 ENCOUNTER — TELEPHONE (OUTPATIENT)
Dept: INTERNAL MEDICINE | Age: 49
End: 2024-10-28
Payer: COMMERCIAL

## 2024-10-28 DIAGNOSIS — Z12.5 SCREENING PSA (PROSTATE SPECIFIC ANTIGEN): ICD-10-CM

## 2024-10-28 DIAGNOSIS — Z00.00 PHYSICAL EXAM, ANNUAL: ICD-10-CM

## 2024-10-28 DIAGNOSIS — E78.2 MIXED HYPERLIPIDEMIA: ICD-10-CM

## 2024-10-28 DIAGNOSIS — E66.812 CLASS 2 OBESITY DUE TO EXCESS CALORIES WITHOUT SERIOUS COMORBIDITY WITH BODY MASS INDEX (BMI) OF 37.0 TO 37.9 IN ADULT: ICD-10-CM

## 2024-10-28 DIAGNOSIS — G47.33 OBSTRUCTIVE SLEEP APNEA, ADULT: ICD-10-CM

## 2024-10-28 DIAGNOSIS — E66.09 CLASS 2 OBESITY DUE TO EXCESS CALORIES WITHOUT SERIOUS COMORBIDITY WITH BODY MASS INDEX (BMI) OF 37.0 TO 37.9 IN ADULT: ICD-10-CM

## 2024-10-28 DIAGNOSIS — D75.1 POLYCYTHEMIA: ICD-10-CM

## 2024-12-02 ENCOUNTER — TELEPHONE (OUTPATIENT)
Dept: INTERNAL MEDICINE | Age: 49
End: 2024-12-02

## 2024-12-02 NOTE — TELEPHONE ENCOUNTER
Caller: JUSTINA GUILLERMO    Relationship to patient: Emergency Contact    Best call back number: [phone]    PATIENT SPOUSE IS CALLING TODAY TO LET DOCTOR VEL KNOW SHE IS WANTING PROVIDER TO SPEAK REGARDING A COLONOSCOPY AT NEXT APPOINTMENT AND  REGARDING HIS HANDS THAT HAVE BEEN VERY SHAKY.

## 2024-12-04 ENCOUNTER — LAB (OUTPATIENT)
Dept: LAB | Facility: HOSPITAL | Age: 49
End: 2024-12-04
Payer: COMMERCIAL

## 2024-12-04 DIAGNOSIS — Z12.5 SCREENING PSA (PROSTATE SPECIFIC ANTIGEN): ICD-10-CM

## 2024-12-04 DIAGNOSIS — D75.1 POLYCYTHEMIA: ICD-10-CM

## 2024-12-04 DIAGNOSIS — G47.33 OBSTRUCTIVE SLEEP APNEA, ADULT: ICD-10-CM

## 2024-12-04 DIAGNOSIS — E66.812 CLASS 2 OBESITY DUE TO EXCESS CALORIES WITHOUT SERIOUS COMORBIDITY WITH BODY MASS INDEX (BMI) OF 37.0 TO 37.9 IN ADULT: ICD-10-CM

## 2024-12-04 DIAGNOSIS — Z00.00 PHYSICAL EXAM, ANNUAL: ICD-10-CM

## 2024-12-04 DIAGNOSIS — E66.3 OVER WEIGHT: ICD-10-CM

## 2024-12-04 DIAGNOSIS — E78.2 MIXED HYPERLIPIDEMIA: ICD-10-CM

## 2024-12-04 DIAGNOSIS — E66.09 CLASS 2 OBESITY DUE TO EXCESS CALORIES WITHOUT SERIOUS COMORBIDITY WITH BODY MASS INDEX (BMI) OF 37.0 TO 37.9 IN ADULT: ICD-10-CM

## 2024-12-04 LAB
ALBUMIN SERPL-MCNC: 3.9 G/DL (ref 3.5–5.2)
ALBUMIN/GLOB SERPL: 1.2 G/DL
ALP SERPL-CCNC: 46 U/L (ref 39–117)
ALT SERPL W P-5'-P-CCNC: 18 U/L (ref 1–41)
ANION GAP SERPL CALCULATED.3IONS-SCNC: 10.4 MMOL/L (ref 5–15)
AST SERPL-CCNC: 16 U/L (ref 1–40)
BASOPHILS # BLD AUTO: 0.11 10*3/MM3 (ref 0–0.2)
BASOPHILS NFR BLD AUTO: 1.7 % (ref 0–1.5)
BILIRUB SERPL-MCNC: 0.6 MG/DL (ref 0–1.2)
BUN SERPL-MCNC: 17 MG/DL (ref 6–20)
BUN/CREAT SERPL: 15.3 (ref 7–25)
CALCIUM SPEC-SCNC: 9.3 MG/DL (ref 8.6–10.5)
CHLORIDE SERPL-SCNC: 102 MMOL/L (ref 98–107)
CHOLEST SERPL-MCNC: 111 MG/DL (ref 0–200)
CO2 SERPL-SCNC: 23.6 MMOL/L (ref 22–29)
CREAT SERPL-MCNC: 1.11 MG/DL (ref 0.76–1.27)
DEPRECATED RDW RBC AUTO: 42.4 FL (ref 37–54)
EGFRCR SERPLBLD CKD-EPI 2021: 82.4 ML/MIN/1.73
EOSINOPHIL # BLD AUTO: 0.33 10*3/MM3 (ref 0–0.4)
EOSINOPHIL NFR BLD AUTO: 5.2 % (ref 0.3–6.2)
ERYTHROCYTE [DISTWIDTH] IN BLOOD BY AUTOMATED COUNT: 12.2 % (ref 12.3–15.4)
GLOBULIN UR ELPH-MCNC: 3.2 GM/DL
GLUCOSE SERPL-MCNC: 89 MG/DL (ref 65–99)
HBA1C MFR BLD: 4.9 % (ref 4.8–5.6)
HCT VFR BLD AUTO: 47.2 % (ref 37.5–51)
HDLC SERPL-MCNC: 39 MG/DL (ref 40–60)
HGB BLD-MCNC: 16.1 G/DL (ref 13–17.7)
IMM GRANULOCYTES # BLD AUTO: 0.01 10*3/MM3 (ref 0–0.05)
IMM GRANULOCYTES NFR BLD AUTO: 0.2 % (ref 0–0.5)
LDLC SERPL CALC-MCNC: 54 MG/DL (ref 0–100)
LDLC/HDLC SERPL: 1.36 {RATIO}
LYMPHOCYTES # BLD AUTO: 1.72 10*3/MM3 (ref 0.7–3.1)
LYMPHOCYTES NFR BLD AUTO: 27.1 % (ref 19.6–45.3)
MCH RBC QN AUTO: 32.6 PG (ref 26.6–33)
MCHC RBC AUTO-ENTMCNC: 34.1 G/DL (ref 31.5–35.7)
MCV RBC AUTO: 95.5 FL (ref 79–97)
MONOCYTES # BLD AUTO: 0.6 10*3/MM3 (ref 0.1–0.9)
MONOCYTES NFR BLD AUTO: 9.4 % (ref 5–12)
NEUTROPHILS NFR BLD AUTO: 3.58 10*3/MM3 (ref 1.7–7)
NEUTROPHILS NFR BLD AUTO: 56.4 % (ref 42.7–76)
NRBC BLD AUTO-RTO: 0 /100 WBC (ref 0–0.2)
PLATELET # BLD AUTO: 207 10*3/MM3 (ref 140–450)
PMV BLD AUTO: 10.6 FL (ref 6–12)
POTASSIUM SERPL-SCNC: 4.6 MMOL/L (ref 3.5–5.2)
PROT SERPL-MCNC: 7.1 G/DL (ref 6–8.5)
RBC # BLD AUTO: 4.94 10*6/MM3 (ref 4.14–5.8)
SODIUM SERPL-SCNC: 136 MMOL/L (ref 136–145)
TRIGL SERPL-MCNC: 94 MG/DL (ref 0–150)
VLDLC SERPL-MCNC: 18 MG/DL (ref 5–40)
WBC NRBC COR # BLD AUTO: 6.35 10*3/MM3 (ref 3.4–10.8)

## 2024-12-04 PROCEDURE — 36415 COLL VENOUS BLD VENIPUNCTURE: CPT

## 2024-12-04 PROCEDURE — 80061 LIPID PANEL: CPT

## 2024-12-04 PROCEDURE — 80053 COMPREHEN METABOLIC PANEL: CPT

## 2024-12-04 PROCEDURE — 83036 HEMOGLOBIN GLYCOSYLATED A1C: CPT

## 2024-12-04 PROCEDURE — 85025 COMPLETE CBC W/AUTO DIFF WBC: CPT

## 2024-12-06 ENCOUNTER — TELEPHONE (OUTPATIENT)
Dept: INTERNAL MEDICINE | Age: 49
End: 2024-12-06
Payer: COMMERCIAL

## 2024-12-06 DIAGNOSIS — Z00.00 PHYSICAL EXAM, ANNUAL: ICD-10-CM

## 2024-12-06 DIAGNOSIS — E78.2 MIXED HYPERLIPIDEMIA: ICD-10-CM

## 2024-12-06 DIAGNOSIS — E66.09 CLASS 2 OBESITY DUE TO EXCESS CALORIES WITHOUT SERIOUS COMORBIDITY WITH BODY MASS INDEX (BMI) OF 37.0 TO 37.9 IN ADULT: ICD-10-CM

## 2024-12-06 DIAGNOSIS — E66.812 CLASS 2 OBESITY DUE TO EXCESS CALORIES WITHOUT SERIOUS COMORBIDITY WITH BODY MASS INDEX (BMI) OF 37.0 TO 37.9 IN ADULT: ICD-10-CM

## 2024-12-06 DIAGNOSIS — G47.33 OBSTRUCTIVE SLEEP APNEA, ADULT: ICD-10-CM

## 2024-12-06 DIAGNOSIS — Z12.5 SCREENING PSA (PROSTATE SPECIFIC ANTIGEN): ICD-10-CM

## 2024-12-06 DIAGNOSIS — D75.1 POLYCYTHEMIA: ICD-10-CM

## 2024-12-11 ENCOUNTER — OFFICE VISIT (OUTPATIENT)
Dept: INTERNAL MEDICINE | Age: 49
End: 2024-12-11
Payer: COMMERCIAL

## 2024-12-11 VITALS
DIASTOLIC BLOOD PRESSURE: 72 MMHG | SYSTOLIC BLOOD PRESSURE: 105 MMHG | BODY MASS INDEX: 29.49 KG/M2 | HEART RATE: 79 BPM | TEMPERATURE: 98.2 F | WEIGHT: 206 LBS | HEIGHT: 70 IN | OXYGEN SATURATION: 98 %

## 2024-12-11 DIAGNOSIS — E78.2 MIXED HYPERLIPIDEMIA: Primary | ICD-10-CM

## 2024-12-11 DIAGNOSIS — Z12.5 SCREENING PSA (PROSTATE SPECIFIC ANTIGEN): ICD-10-CM

## 2024-12-11 DIAGNOSIS — E66.812 CLASS 2 OBESITY DUE TO EXCESS CALORIES WITHOUT SERIOUS COMORBIDITY WITH BODY MASS INDEX (BMI) OF 37.0 TO 37.9 IN ADULT: ICD-10-CM

## 2024-12-11 DIAGNOSIS — G47.33 OBSTRUCTIVE SLEEP APNEA, ADULT: ICD-10-CM

## 2024-12-11 DIAGNOSIS — E66.09 CLASS 2 OBESITY DUE TO EXCESS CALORIES WITHOUT SERIOUS COMORBIDITY WITH BODY MASS INDEX (BMI) OF 37.0 TO 37.9 IN ADULT: ICD-10-CM

## 2024-12-11 DIAGNOSIS — D75.1 POLYCYTHEMIA: ICD-10-CM

## 2024-12-11 DIAGNOSIS — G25.0 BENIGN ESSENTIAL TREMOR: ICD-10-CM

## 2024-12-11 DIAGNOSIS — Z12.11 SCREENING FOR COLON CANCER: ICD-10-CM

## 2024-12-11 PROCEDURE — 99214 OFFICE O/P EST MOD 30 MIN: CPT | Performed by: INTERNAL MEDICINE

## 2024-12-11 RX ORDER — PROPRANOLOL HCL 20 MG
20 TABLET ORAL 2 TIMES DAILY
Qty: 60 TABLET | Refills: 5 | Status: SHIPPED | OUTPATIENT
Start: 2024-12-11

## 2024-12-11 NOTE — PROGRESS NOTES
"CHIEF COMPLAINT/ HPI:  Hyperlipidemia (Routine follow up, Lab follow up, Decline flu shot. Zepbound refill if needed. )  Patient is also wanting to consider colonoscopy, concerned about his hand tremors been going on for several years getting more noticeable,            Objective   Vital Signs  Vitals:    12/11/24 1451   BP: 105/72   BP Location: Right arm   Patient Position: Sitting   Pulse: 79   Temp: 98.2 °F (36.8 °C)   SpO2: 98%   Weight: 93.4 kg (206 lb)   Height: 177.8 cm (70\")      Body mass index is 29.56 kg/m².  Review of Systems   Constitutional: Negative.    HENT: Negative.     Eyes: Negative.    Respiratory: Negative.     Cardiovascular: Negative.    Gastrointestinal: Negative.    Endocrine: Negative.    Genitourinary: Negative.    Musculoskeletal: Negative.    Allergic/Immunologic: Negative.    Neurological: Negative.    Hematological: Negative.    Psychiatric/Behavioral: Negative.        Physical Exam  Constitutional:       General: He is not in acute distress.     Appearance: Normal appearance.   HENT:      Head: Normocephalic.      Mouth/Throat:      Mouth: Mucous membranes are moist.   Eyes:      Conjunctiva/sclera: Conjunctivae normal.      Pupils: Pupils are equal, round, and reactive to light.   Cardiovascular:      Rate and Rhythm: Normal rate and regular rhythm.      Pulses: Normal pulses.      Heart sounds: Normal heart sounds.   Pulmonary:      Effort: Pulmonary effort is normal.      Breath sounds: Normal breath sounds.   Abdominal:      General: Abdomen is flat. Bowel sounds are normal.      Palpations: Abdomen is soft.   Musculoskeletal:         General: No swelling. Normal range of motion.      Cervical back: Neck supple.   Skin:     General: Skin is warm and dry.      Coloration: Skin is not jaundiced.   Neurological:      General: No focal deficit present.      Mental Status: He is alert and oriented to person, place, and time. Mental status is at baseline.   Psychiatric:         Mood " and Affect: Mood normal.         Behavior: Behavior normal.         Thought Content: Thought content normal.         Judgment: Judgment normal.     Fine tremors of his hands wrists mainly with intention,  Result Review :   Lab Results   Component Value Date     01/15/2021     CMP          5/28/2024    09:14 12/4/2024    08:17   CMP   Glucose 94  89    BUN 18  17    Creatinine 1.11  1.11    EGFR 82.4  82.4    Sodium 139  136    Potassium 4.4  4.6    Chloride 107  102    Calcium 9.3  9.3    Total Protein 6.9  7.1    Albumin 4.3  3.9    Globulin 2.6  3.2    Total Bilirubin 0.5  0.6    Alkaline Phosphatase 47  46    AST (SGOT) 15  16    ALT (SGPT) 21  18    Albumin/Globulin Ratio 1.7  1.2    BUN/Creatinine Ratio 16.2  15.3    Anion Gap 8.9  10.4      CBC w/diff          5/28/2024    09:14 12/4/2024    08:17   CBC w/Diff   WBC 7.14  6.35    RBC 5.10  4.94    Hemoglobin 16.3  16.1    Hematocrit 47.0  47.2    MCV 92.2  95.5    MCH 32.0  32.6    MCHC 34.7  34.1    RDW 12.2  12.2    Platelets 185  207    Neutrophil Rel % 52.2  56.4    Immature Granulocyte Rel % 0.3  0.2    Lymphocyte Rel % 31.4  27.1    Monocyte Rel % 9.2  9.4    Eosinophil Rel % 5.9  5.2    Basophil Rel % 1.0  1.7       Lipid Panel          5/28/2024    09:14 12/4/2024    08:17   Lipid Panel   Total Cholesterol 102  111    Triglycerides 160  94    HDL Cholesterol 36  39    VLDL Cholesterol 27  18    LDL Cholesterol  39  54    LDL/HDL Ratio 0.94  1.36       Lab Results   Component Value Date    TSH 0.936 05/28/2024    TSH 0.961 11/30/2023    TSH 1.060 10/25/2023      Lab Results   Component Value Date    FREET4 0.90 (L) 05/28/2024    FREET4 0.98 11/30/2023    FREET4 0.99 10/25/2023      A1C Last 3 Results          12/4/2024    08:17   HGBA1C Last 3 Results   Hemoglobin A1C 4.90       PSA          5/28/2024    09:14   PSA   PSA 0.463                     Visit Diagnoses:    ICD-10-CM ICD-9-CM   1. Mixed hyperlipidemia  E78.2 272.2   2. Screening PSA  (prostate specific antigen)  Z12.5 V76.44   3. Polycythemia  D75.1 238.4   4. Obstructive sleep apnea, adult  G47.33 327.23   5. Class 2 obesity due to excess calories without serious comorbidity with body mass index (BMI) of 37.0 to 37.9 in adult  E66.812 278.00    E66.09 V85.37    Z68.37    6. Screening for colon cancer  Z12.11 V76.51   7. Benign essential tremor  G25.0 333.1       Assessment and Plan   Diagnoses and all orders for this visit:    1. Mixed hyperlipidemia (Primary)  -     Tirzepatide-Weight Management (ZEPBOUND) 15 MG/0.5ML solution auto-injector; Inject 0.5 mL under the skin into the appropriate area as directed 1 (One) Time Per Week.  Dispense: 2 mL; Refill: 5  -     Ambulatory Referral to Gastroenterology  -     Lipid Panel; Future  -     PSA Screen; Future  -     Comprehensive Metabolic Panel; Future  -     Hemoglobin A1c; Future  -     TSH+Free T4; Future  -     CT Cardiac Calcium Score Without Dye; Future    2. Screening PSA (prostate specific antigen)  -     Tirzepatide-Weight Management (ZEPBOUND) 15 MG/0.5ML solution auto-injector; Inject 0.5 mL under the skin into the appropriate area as directed 1 (One) Time Per Week.  Dispense: 2 mL; Refill: 5  -     Ambulatory Referral to Gastroenterology  -     Lipid Panel; Future  -     PSA Screen; Future  -     Comprehensive Metabolic Panel; Future  -     Hemoglobin A1c; Future  -     TSH+Free T4; Future  -     CT Cardiac Calcium Score Without Dye; Future    3. Polycythemia  -     Tirzepatide-Weight Management (ZEPBOUND) 15 MG/0.5ML solution auto-injector; Inject 0.5 mL under the skin into the appropriate area as directed 1 (One) Time Per Week.  Dispense: 2 mL; Refill: 5  -     Ambulatory Referral to Gastroenterology  -     Lipid Panel; Future  -     PSA Screen; Future  -     Comprehensive Metabolic Panel; Future  -     Hemoglobin A1c; Future  -     TSH+Free T4; Future  -     CT Cardiac Calcium Score Without Dye; Future    4. Obstructive sleep apnea,  adult  -     Tirzepatide-Weight Management (ZEPBOUND) 15 MG/0.5ML solution auto-injector; Inject 0.5 mL under the skin into the appropriate area as directed 1 (One) Time Per Week.  Dispense: 2 mL; Refill: 5  -     Ambulatory Referral to Gastroenterology  -     Lipid Panel; Future  -     PSA Screen; Future  -     Comprehensive Metabolic Panel; Future  -     Hemoglobin A1c; Future  -     TSH+Free T4; Future  -     CT Cardiac Calcium Score Without Dye; Future    5. Class 2 obesity due to excess calories without serious comorbidity with body mass index (BMI) of 37.0 to 37.9 in adult  -     Tirzepatide-Weight Management (ZEPBOUND) 15 MG/0.5ML solution auto-injector; Inject 0.5 mL under the skin into the appropriate area as directed 1 (One) Time Per Week.  Dispense: 2 mL; Refill: 5  -     Ambulatory Referral to Gastroenterology  -     Lipid Panel; Future  -     PSA Screen; Future  -     Comprehensive Metabolic Panel; Future  -     Hemoglobin A1c; Future  -     TSH+Free T4; Future  -     CT Cardiac Calcium Score Without Dye; Future    6. Screening for colon cancer  -     Ambulatory Referral to Gastroenterology  -     Lipid Panel; Future  -     PSA Screen; Future  -     Comprehensive Metabolic Panel; Future  -     Hemoglobin A1c; Future  -     TSH+Free T4; Future  -     CT Cardiac Calcium Score Without Dye; Future    7. Benign essential tremor  -     Lipid Panel; Future  -     PSA Screen; Future  -     Comprehensive Metabolic Panel; Future  -     Hemoglobin A1c; Future  -     TSH+Free T4; Future  -     CT Cardiac Calcium Score Without Dye; Future    Other orders  -     propranolol (INDERAL) 20 MG tablet; Take 1 tablet by mouth 2 (Two) Times a Day.  Dispense: 60 tablet; Refill: 5    Colon cancer screening discussed, will put referral into dr rosario, December 11, 2024    Coronary calcium scoring discussed, will place the order for that December 11, 2024    Hand tremors fine, most likely essential tremors not consistent with  Parkinson's disease discussed diagnosis treatment options, December 11, 2024, propranolol 20 mg twice a day as needed sent in for patient,    Annual exam, June 6, 2024 , ---preventitive exam --wears seatbelts --, patient does not smoke, only drinks socially, wears a seatbelt, encouraged him to continue aerobic exercise continue weight loss efforts,    Obese,---------- patient is lost approximately 80 pounds now over the past year and a half, continues with zepbound 15 mg weekly dose, continues to do well,    Hemoglobin A1c 4.9 December 4, 2024    Mixed hyperlipidemia --continues rosuvastatin 10 mg daily,    Sleep issues , home sleep study June 6, 2023, ===cont cpap ---    PSA 0.46 may 2024                  Follow Up   Return in about 6 months (around 6/11/2025).  Patient was given instructions and counseling regarding his condition or for health maintenance advice. Please see specific information pulled into the AVS if appropriate.

## 2025-02-11 ENCOUNTER — TELEPHONE (OUTPATIENT)
Dept: INTERNAL MEDICINE | Age: 50
End: 2025-02-11
Payer: COMMERCIAL

## 2025-02-13 ENCOUNTER — HOSPITAL ENCOUNTER (OUTPATIENT)
Dept: CT IMAGING | Facility: HOSPITAL | Age: 50
Discharge: HOME | End: 2025-02-13
Admitting: INTERNAL MEDICINE

## 2025-02-13 DIAGNOSIS — D75.1 POLYCYTHEMIA: ICD-10-CM

## 2025-02-13 DIAGNOSIS — Z12.5 SCREENING PSA (PROSTATE SPECIFIC ANTIGEN): ICD-10-CM

## 2025-02-13 DIAGNOSIS — E66.09 CLASS 2 OBESITY DUE TO EXCESS CALORIES WITHOUT SERIOUS COMORBIDITY WITH BODY MASS INDEX (BMI) OF 37.0 TO 37.9 IN ADULT: ICD-10-CM

## 2025-02-13 DIAGNOSIS — E78.2 MIXED HYPERLIPIDEMIA: ICD-10-CM

## 2025-02-13 DIAGNOSIS — E66.812 CLASS 2 OBESITY DUE TO EXCESS CALORIES WITHOUT SERIOUS COMORBIDITY WITH BODY MASS INDEX (BMI) OF 37.0 TO 37.9 IN ADULT: ICD-10-CM

## 2025-02-13 DIAGNOSIS — G47.33 OBSTRUCTIVE SLEEP APNEA, ADULT: ICD-10-CM

## 2025-02-13 DIAGNOSIS — G25.0 BENIGN ESSENTIAL TREMOR: ICD-10-CM

## 2025-02-13 DIAGNOSIS — Z12.11 SCREENING FOR COLON CANCER: ICD-10-CM

## 2025-02-13 PROCEDURE — 75571 CT HRT W/O DYE W/CA TEST: CPT

## 2025-04-03 RX ORDER — ROSUVASTATIN CALCIUM 10 MG/1
10 TABLET, COATED ORAL DAILY
Qty: 90 TABLET | Refills: 1 | Status: SHIPPED | OUTPATIENT
Start: 2025-04-03

## 2025-04-10 ENCOUNTER — OFFICE VISIT (OUTPATIENT)
Dept: GASTROENTEROLOGY | Facility: CLINIC | Age: 50
End: 2025-04-10
Payer: COMMERCIAL

## 2025-04-10 VITALS
OXYGEN SATURATION: 99 % | DIASTOLIC BLOOD PRESSURE: 86 MMHG | BODY MASS INDEX: 30.49 KG/M2 | SYSTOLIC BLOOD PRESSURE: 125 MMHG | HEIGHT: 70 IN | HEART RATE: 88 BPM | WEIGHT: 213 LBS

## 2025-04-10 DIAGNOSIS — Z12.11 ENCOUNTER FOR SCREENING FOR MALIGNANT NEOPLASM OF COLON: Primary | ICD-10-CM

## 2025-04-10 RX ORDER — SOD SULF/POT CHLORIDE/MAG SULF 1.479 G
12 TABLET ORAL TAKE AS DIRECTED
Qty: 24 TABLET | Refills: 0 | Status: SHIPPED | OUTPATIENT
Start: 2025-04-10

## 2025-04-10 NOTE — PROGRESS NOTES
Chief Complaint  NEW PATIENT  (-COLONOSCOPY QUESTIONS)    Patient or patient representative verbalized consent for the use of Ambient Listening during the visit with  DEV Fernandez for chart documentation. 4/10/2025  10:35 EDT    Calvin Nye is a 49 y.o. male who [facility] from Henrry Robles, * for a gastroenterology evaluation of screening colonoscopy.      History of Present Illness  The patient is a 49-year-old male who presents to the office for a colonoscopy.    He has no prior history of undergoing a colonoscopy. He reports no abdominal discomfort, alterations in bowel habits, or presence of blood or black stools. Additionally, he does not experience any upper gastrointestinal symptoms such as nausea, vomiting, persistent heartburn, or difficulty swallowing. He is currently on a GLP-1 medication and takes baby aspirin. He does not take any anticoagulants like Eliquis or Plavix. He is not under the care of a cardiologist or pulmonologist.        Past Medical History:   Diagnosis Date    Acid reflux     Allergies     HTN (hypertension)     JESSE (obstructive sleep apnea)        History reviewed. No pertinent surgical history.      Current Outpatient Medications:     aspirin 81 MG EC tablet, Take 1 tablet by mouth Daily., Disp: , Rfl:     Cetirizine HCl (ZyrTEC Allergy) 10 MG capsule, Take 10 mg by mouth Daily., Disp: , Rfl:     coenzyme Q10 50 MG capsule capsule, Take 1 capsule by mouth Daily., Disp: , Rfl:     Magnesium Chloride (MAGNESIUM DR PO), Take 1 tablet by mouth Daily., Disp: , Rfl:     multivitamin with minerals tablet tablet, Take 1 tablet by mouth Daily., Disp: , Rfl:     propranolol (INDERAL) 20 MG tablet, Take 1 tablet by mouth 2 (Two) Times a Day., Disp: 60 tablet, Rfl: 5    rosuvastatin (CRESTOR) 10 MG tablet, TAKE 1 TABLET BY MOUTH EVERY DAY, Disp: 90 tablet, Rfl: 1    Tirzepatide-Weight Management  "(ZEPBOUND) 15 MG/0.5ML solution auto-injector, Inject 0.5 mL under the skin into the appropriate area as directed 1 (One) Time Per Week., Disp: 2 mL, Rfl: 5    Sodium Sulfate-Mag Sulfate-KCl (Sutab) 2942-420-704 MG tablet, Take 12 tablets by mouth Take As Directed. Take 12 tablets at 6 pm and 12 tablets 4 hours prior to procedure., Disp: 24 tablet, Rfl: 0     No Known Allergies    History reviewed. No pertinent family history.     Social History     Social History Narrative    Not on file       Immunization:  Immunization History   Administered Date(s) Administered    COVID-19 (PFIZER) Purple Cap Monovalent 05/27/2021, 06/16/2021    Covid-19 (Pfizer) Gray Cap Monovalent 02/11/2022    Flu Vaccine Split Quad 01/19/2021    Fluzone (or Fluarix & Flulaval for VFC) >6mos 01/19/2021        Objective     Vital Signs:   /86   Pulse 88   Ht 177.8 cm (70\")   Wt 96.6 kg (213 lb)   SpO2 99%   BMI 30.56 kg/m²       Physical Exam  Constitutional:       Appearance: Normal appearance. He is normal weight.   HENT:      Head: Normocephalic and atraumatic.      Nose: Nose normal.   Pulmonary:      Effort: Pulmonary effort is normal.   Skin:     General: Skin is warm and dry.   Neurological:      Mental Status: He is alert and oriented to person, place, and time. Mental status is at baseline.   Psychiatric:         Mood and Affect: Mood normal.         Behavior: Behavior normal.         Thought Content: Thought content normal.         Judgment: Judgment normal.         Result Review :     CBC w/diff          5/28/2024    09:14 12/4/2024    08:17   CBC w/Diff   WBC 7.14  6.35    RBC 5.10  4.94    Hemoglobin 16.3  16.1    Hematocrit 47.0  47.2    MCV 92.2  95.5    MCH 32.0  32.6    MCHC 34.7  34.1    RDW 12.2  12.2    Platelets 185  207    Neutrophil Rel % 52.2  56.4    Immature Granulocyte Rel % 0.3  0.2    Lymphocyte Rel % 31.4  27.1    Monocyte Rel % 9.2  9.4    Eosinophil Rel % 5.9  5.2    Basophil Rel % 1.0  1.7      CMP  "         5/28/2024    09:14 12/4/2024    08:17   CMP   Glucose 94  89    BUN 18  17    Creatinine 1.11  1.11    EGFR 82.4  82.4    Sodium 139  136    Potassium 4.4  4.6    Chloride 107  102    Calcium 9.3  9.3    Total Protein 6.9  7.1    Albumin 4.3  3.9    Globulin 2.6  3.2    Total Bilirubin 0.5  0.6    Alkaline Phosphatase 47  46    AST (SGOT) 15  16    ALT (SGPT) 21  18    Albumin/Globulin Ratio 1.7  1.2    BUN/Creatinine Ratio 16.2  15.3    Anion Gap 8.9  10.4                    Assessment and Plan    Diagnoses and all orders for this visit:    1. Encounter for screening for malignant neoplasm of colon (Primary)  -     Case Request; Standing  -     Follow Anesthesia Guidelines / Protocol; Standing  -     Follow Anesthesia Guidelines / Protocol; Future  -     Verify NPO; Standing  -     Case Request    Other orders  -     Sodium Sulfate-Mag Sulfate-KCl (Sutab) 9157-698-810 MG tablet; Take 12 tablets by mouth Take As Directed. Take 12 tablets at 6 pm and 12 tablets 4 hours prior to procedure.  Dispense: 24 tablet; Refill: 0      Assessment & Plan  1. Colonoscopy.  He has no prior history of undergoing a colonoscopy. A comprehensive discussion regarding the colonoscopy procedure was conducted. The potential risks associated with the procedure, including bleeding, perforation, and injury to the colon, were thoroughly explained. He was advised to discontinue his GLP-1 medication 7 days prior to the procedure. A prescription for bowel preparation was provided.      Follow Up   Return if symptoms worsen or fail to improve.  Patient was given instructions and counseling regarding his condition or for health maintenance advice. Please see specific information pulled into the AVS if appropriate.

## 2025-04-15 ENCOUNTER — TELEPHONE (OUTPATIENT)
Dept: INTERNAL MEDICINE | Age: 50
End: 2025-04-15
Payer: COMMERCIAL

## 2025-04-15 ENCOUNTER — OFFICE VISIT (OUTPATIENT)
Dept: SLEEP MEDICINE | Facility: HOSPITAL | Age: 50
End: 2025-04-15
Payer: COMMERCIAL

## 2025-04-15 VITALS — WEIGHT: 205 LBS | HEIGHT: 70 IN | BODY MASS INDEX: 29.35 KG/M2 | HEART RATE: 73 BPM | OXYGEN SATURATION: 98 %

## 2025-04-15 DIAGNOSIS — G47.33 SEVERE OBSTRUCTIVE SLEEP APNEA: Primary | ICD-10-CM

## 2025-04-15 DIAGNOSIS — E66.3 OVERWEIGHT (BMI 25.0-29.9): ICD-10-CM

## 2025-04-15 PROCEDURE — G0463 HOSPITAL OUTPT CLINIC VISIT: HCPCS

## 2025-04-15 NOTE — PROGRESS NOTES
"  [address] [address]  Phone: [phone]  Fax: [phone]        SLEEP CLINIC FOLLOW-UP PROGRESS NOTE    Calvin Nye  [id number]   1975  49 y.o.  male      PCP: Henrry Robles MD    DATE OF VISIT: 4/15/2025          CHIEF COMPLAINT: Obstructive sleep apnea    HPI:  This is a 49 y.o. year old patient who presents to the clinic today for the management of obstructive sleep apnea.  Patient had a(n) home sleep study 6/2023 showing severe obstructive sleep apnea with AHI of 76/hr.  Weight noted to be 262lb at visit prior to study.  This patient is using positive airway pressure therapy with auto CPAP.   Patient's sleep apnea has improved with this therapy with residual AHI 1.5/hr.   Average usage is 5 hours 36 minutes per night on nights used.   Patient tolerating device well and improved with treatment.  Has had some weight loss but still feels he benefits from PAP device with the weight loss he has had.  Declines retesting at this time.            MEDICATIONS: reviewed     ALLERGIES:  Patient has no known allergies.    SOCIAL HISTORY (habits pertaining to sleep medicine):  Tobacco use: No   Alcohol use: 3 per week  Caffeine use: 0     REVIEW OF SYSTEMS:   Pertinent positive symptoms are:  [place] Sleepiness Scale :Total score: 9         PHYSICAL EXAMINATION:  CONSTITUTIONAL:  Vitals:    04/15/25 0900   Pulse: 73   SpO2: 98%   Weight: 93 kg (205 lb)   Height: 177.8 cm (70\")    Body mass index is 29.41 kg/m².   HEAD: atraumatic, normocephalic  RESP SYSTEM: not in respiratory distress, breathing unlabored  CARDIOVASULAR: normal rate, no edema noted   NEURO: Alert and oriented x 3, mood and affect appeared appropriate      DATA REVIEWED:  The PAP compliance summary downloaded on 4/8/2025 has been reviewed independently by me and discussed with the patient.   Compliance: 90%  More than 4 hr use: 73%  Average use of the device: 5 hours 36 " minutes per night  Residual AHI: 1.5/hr (goal < 5.0 /hr)  Device: ResMed air sense 11  DME: [employer]          ASSESSMENT AND PLAN:  Obstructive Sleep Apnea: sleep apnea has improved with the device and treatment.  Patient has excellent compliance with the device for treatment of sleep apnea.  I have personally reviewed the smart card download and discussed the download data with the patient and encouarged continued use of the device.  The residual AHI is acceptable. The device is benefiting the patient and the device is medically necessary.  Recommend patient get supplies from the DME company, change them on a regular basis, and clean as directed.  A prescription for supplies has been sent to the [email or web address].  Recommend continued usage of PAP device, most insurances require minimum usage of 4 hours per night at least 70% of the time, would recommend using all night every night if possible for optimum health.  Recommend prioritizing sleep to aid in overall health.  Do not drive or operate heavy machinery or do activities that require high concentration if feeling tired or drowsy.  Overweight: Body mass index is 29.41 kg/m².. Patients who are overweight or obese are at increased risk of sleep apnea/ sleep disordered breathing. Weight reduction and healthy lifestyle are encouraged in overweight/ obese patients as part of a comprehensive approach to sleep apnea treatment.   We did discuss that some patients may have sleep apnea regardless of their weight, however if patient gets to a point with weight loss where he can no longer tell if he is getting improvement from PAP device or he feels weight has stabilized/he is maintaining weight loss, could look at retesting at that point.  Patient on board with plan.  He declines retesting at this point as he still feels he benefits from PAP device.  Remains on Zepbound per outside provider and plans to continue to work on healthy weight loss.      Patient will follow-up in 1  year or follow-up sooner for any issues or concerns.  Patient's questions were answered.          Thank you for allowing me to participate in the care of this patient.     Samira Hall DNP, APRN  [facility]

## 2025-05-07 ENCOUNTER — TELEPHONE (OUTPATIENT)
Dept: INTERNAL MEDICINE | Age: 50
End: 2025-05-07
Payer: COMMERCIAL

## 2025-05-07 DIAGNOSIS — E66.812 CLASS 2 OBESITY DUE TO EXCESS CALORIES WITHOUT SERIOUS COMORBIDITY WITH BODY MASS INDEX (BMI) OF 37.0 TO 37.9 IN ADULT: ICD-10-CM

## 2025-05-07 DIAGNOSIS — D75.1 POLYCYTHEMIA: ICD-10-CM

## 2025-05-07 DIAGNOSIS — E78.2 MIXED HYPERLIPIDEMIA: ICD-10-CM

## 2025-05-07 DIAGNOSIS — Z12.5 SCREENING PSA (PROSTATE SPECIFIC ANTIGEN): ICD-10-CM

## 2025-05-07 DIAGNOSIS — G47.33 OBSTRUCTIVE SLEEP APNEA, ADULT: ICD-10-CM

## 2025-05-07 DIAGNOSIS — E66.09 CLASS 2 OBESITY DUE TO EXCESS CALORIES WITHOUT SERIOUS COMORBIDITY WITH BODY MASS INDEX (BMI) OF 37.0 TO 37.9 IN ADULT: ICD-10-CM

## 2025-05-07 NOTE — PRE-PROCEDURE INSTRUCTIONS
Reminded of arrival time at 1100 , Entrance C of the [facility] hospital. Instructed to bring or have a  over the age of 18 set up to drive you home the day of procedure.    Instructed on clear liquid diet the day before, nothing red or purple. Call with any questions about the prep or if in need of the prep.  Reminded them not to eat or drink anything am of procedure unless its a sip of water with medications. Hold zepbound 7 days prior to procedure, last dose 5/1. Hold aspirn am of procedure.  Patient verbalized understanding.

## 2025-05-13 ENCOUNTER — TELEPHONE (OUTPATIENT)
Dept: GASTROENTEROLOGY | Facility: CLINIC | Age: 50
End: 2025-05-13
Payer: COMMERCIAL

## 2025-05-13 DIAGNOSIS — E78.2 MIXED HYPERLIPIDEMIA: ICD-10-CM

## 2025-05-13 DIAGNOSIS — E66.812 CLASS 2 OBESITY DUE TO EXCESS CALORIES WITHOUT SERIOUS COMORBIDITY WITH BODY MASS INDEX (BMI) OF 37.0 TO 37.9 IN ADULT: ICD-10-CM

## 2025-05-13 DIAGNOSIS — Z12.5 SCREENING PSA (PROSTATE SPECIFIC ANTIGEN): ICD-10-CM

## 2025-05-13 DIAGNOSIS — E66.09 CLASS 2 OBESITY DUE TO EXCESS CALORIES WITHOUT SERIOUS COMORBIDITY WITH BODY MASS INDEX (BMI) OF 37.0 TO 37.9 IN ADULT: ICD-10-CM

## 2025-05-13 DIAGNOSIS — G47.33 OBSTRUCTIVE SLEEP APNEA, ADULT: ICD-10-CM

## 2025-05-13 DIAGNOSIS — D75.1 POLYCYTHEMIA: ICD-10-CM

## 2025-05-13 RX ORDER — TIRZEPATIDE 15 MG/.5ML
INJECTION, SOLUTION SUBCUTANEOUS
Qty: 2 ML | OUTPATIENT
Start: 2025-05-13

## 2025-05-13 NOTE — TELEPHONE ENCOUNTER
ENDO RECONCILIATION  Verify source of procedure(s): Office Visit w/ Neda Boudreaux on 4/10/25        TIME OUT-CONFIRM CORRECT PROCEDURE: Colonoscopy      Cardiology: no  Pulmonology: no  Blood thinner: no  GLP-1: Zepbound  Additional DX/indication for procedure: N/A  Please include any other notes relevant to endo reconciliation: N//A

## 2025-05-14 ENCOUNTER — ANESTHESIA EVENT (OUTPATIENT)
Dept: GASTROENTEROLOGY | Facility: HOSPITAL | Age: 50
End: 2025-05-14
Payer: COMMERCIAL

## 2025-05-14 RX ORDER — SODIUM CHLORIDE, SODIUM LACTATE, POTASSIUM CHLORIDE, CALCIUM CHLORIDE 600; 310; 30; 20 MG/100ML; MG/100ML; MG/100ML; MG/100ML
30 INJECTION, SOLUTION INTRAVENOUS CONTINUOUS
Status: CANCELLED | OUTPATIENT
Start: 2025-05-15 | End: 2025-05-15

## 2025-05-14 NOTE — ANESTHESIA PREPROCEDURE EVALUATION
Anesthesia Evaluation     Nursing notes reviewed   NPO Solid Status: > 8 hours  NPO Liquid Status: > 2 hours           Airway   Mallampati: III  TM distance: >3 FB  Neck ROM: full  No difficulty expected  Dental - normal exam     Pulmonary - normal exam    breath sounds clear to auscultation  (+) pneumonia ,sleep apnea  Cardiovascular - normal exam    Rhythm: regular  Rate: normal    (+) hypertension, hyperlipidemia      Neuro/Psych  GI/Hepatic/Renal/Endo    (+) obesity, morbid obesity, GERD    Musculoskeletal     Abdominal    Substance History      OB/GYN          Other        ROS/Med Hx Other: Last zepbound: 5/1/25                Anesthesia Plan    ASA 2     general     (Total IV Anesthesia    Patient understands anesthesia not responsible for dental damage.  )  intravenous induction     Anesthetic plan, risks, benefits, and alternatives have been provided, discussed and informed consent has been obtained with: patient.    Plan discussed with CRNA.      CODE STATUS:

## 2025-05-15 ENCOUNTER — ANESTHESIA (OUTPATIENT)
Dept: GASTROENTEROLOGY | Facility: HOSPITAL | Age: 50
End: 2025-05-15
Payer: COMMERCIAL

## 2025-05-15 ENCOUNTER — HOSPITAL ENCOUNTER (OUTPATIENT)
Facility: HOSPITAL | Age: 50
Setting detail: HOSPITAL OUTPATIENT SURGERY
Discharge: HOME | End: 2025-05-15
Attending: INTERNAL MEDICINE | Admitting: INTERNAL MEDICINE
Payer: COMMERCIAL

## 2025-05-15 VITALS
BODY MASS INDEX: 29.89 KG/M2 | OXYGEN SATURATION: 96 % | RESPIRATION RATE: 18 BRPM | DIASTOLIC BLOOD PRESSURE: 88 MMHG | WEIGHT: 208.34 LBS | HEART RATE: 77 BPM | SYSTOLIC BLOOD PRESSURE: 116 MMHG | TEMPERATURE: 98.2 F

## 2025-05-15 DIAGNOSIS — Z12.11 ENCOUNTER FOR SCREENING FOR MALIGNANT NEOPLASM OF COLON: ICD-10-CM

## 2025-05-15 PROCEDURE — 25010000002 PROPOFOL 10 MG/ML EMULSION

## 2025-05-15 PROCEDURE — 45385 COLONOSCOPY W/LESION REMOVAL: CPT | Performed by: INTERNAL MEDICINE

## 2025-05-15 PROCEDURE — 25810000003 LACTATED RINGERS PER 1000 ML

## 2025-05-15 PROCEDURE — 88305 TISSUE EXAM BY PATHOLOGIST: CPT | Performed by: INTERNAL MEDICINE

## 2025-05-15 PROCEDURE — 25010000002 LIDOCAINE PF 2% 2 % SOLUTION

## 2025-05-15 RX ORDER — LIDOCAINE HYDROCHLORIDE 20 MG/ML
INJECTION, SOLUTION EPIDURAL; INFILTRATION; INTRACAUDAL; PERINEURAL AS NEEDED
Status: DISCONTINUED | OUTPATIENT
Start: 2025-05-15 | End: 2025-05-15 | Stop reason: SURG

## 2025-05-15 RX ORDER — PROPOFOL 10 MG/ML
VIAL (ML) INTRAVENOUS AS NEEDED
Status: DISCONTINUED | OUTPATIENT
Start: 2025-05-15 | End: 2025-05-15 | Stop reason: SURG

## 2025-05-15 RX ORDER — SODIUM CHLORIDE, SODIUM LACTATE, POTASSIUM CHLORIDE, CALCIUM CHLORIDE 600; 310; 30; 20 MG/100ML; MG/100ML; MG/100ML; MG/100ML
INJECTION, SOLUTION INTRAVENOUS CONTINUOUS PRN
Status: DISCONTINUED | OUTPATIENT
Start: 2025-05-15 | End: 2025-05-15 | Stop reason: SURG

## 2025-05-15 RX ADMIN — PROPOFOL 100 MG: 10 INJECTION, EMULSION INTRAVENOUS at 13:08

## 2025-05-15 RX ADMIN — PROPOFOL 200 MCG/KG/MIN: 10 INJECTION, EMULSION INTRAVENOUS at 13:09

## 2025-05-15 RX ADMIN — LIDOCAINE HYDROCHLORIDE 50 MG: 20 INJECTION, SOLUTION INTRAVENOUS at 13:08

## 2025-05-15 RX ADMIN — SODIUM CHLORIDE, POTASSIUM CHLORIDE, SODIUM LACTATE AND CALCIUM CHLORIDE: 600; 310; 30; 20 INJECTION, SOLUTION INTRAVENOUS at 13:09

## 2025-05-15 NOTE — H&P
Pre Procedure History & Physical    Chief Complaint:   Screening    Subjective     HPI:   Colon cancer screening    Past Medical History:   Past Medical History:   Diagnosis Date    Acid reflux     Allergies     HTN (hypertension)     JESSE (obstructive sleep apnea)        Past Surgical History:  No past surgical history on file.    Family History:  No family history on file.    Social History:   reports that he has never smoked. He has never used smokeless tobacco. He reports current alcohol use. He reports that he does not use drugs.    Medications:   Medications Prior to Admission   Medication Sig Dispense Refill Last Dose/Taking    aspirin 81 MG EC tablet Take 1 tablet by mouth Daily.       Cetirizine HCl (ZyrTEC Allergy) 10 MG capsule Take 10 mg by mouth Daily.       coenzyme Q10 50 MG capsule capsule Take 1 capsule by mouth Daily.       Magnesium Chloride (MAGNESIUM DR PO) Take 1 tablet by mouth Daily.       multivitamin with minerals tablet tablet Take 1 tablet by mouth Daily.       propranolol (INDERAL) 20 MG tablet Take 1 tablet by mouth 2 (Two) Times a Day. 60 tablet 5     rosuvastatin (CRESTOR) 10 MG tablet TAKE 1 TABLET BY MOUTH EVERY DAY 90 tablet 1     Sodium Sulfate-Mag Sulfate-KCl (Sutab) 3803-863-852 MG tablet Take 12 tablets by mouth Take As Directed. Take 12 tablets at 6 pm and 12 tablets 4 hours prior to procedure. 24 tablet 0     Tirzepatide-Weight Management (ZEPBOUND) 15 MG/0.5ML solution auto-injector Inject 0.5 mL under the skin into the appropriate area as directed 1 (One) Time Per Week. 6 mL 3        Allergies:  Patient has no known allergies.        Objective     Weight 94.5 kg (208 lb 5.4 oz).    Physical Exam   Constitutional: Pt is oriented to person, place, and time and well-developed, well-nourished, and in no distress.   Mouth/Throat: Oropharynx is clear and moist.   Neck: Normal range of motion.   Cardiovascular: Normal rate, regular rhythm and normal heart sounds.     Pulmonary/Chest: Effort normal and breath sounds normal.   Abdominal: Soft. Nontender  Skin: Skin is warm and dry.   Psychiatric: Mood, memory, affect and judgment normal.     Assessment & Plan     Diagnosis:  Screening    Anticipated Surgical Procedure:  Colonoscopy    The risks, benefits, and alternatives of this procedure have been discussed with the patient or the responsible party- the patient understands and agrees to proceed.

## 2025-05-15 NOTE — ANESTHESIA POSTPROCEDURE EVALUATION
Patient: Calvin Nye    Procedure Summary       Date: 05/15/25 Room / Location: [facility] ENDOSCOPY 4 / [facility] ENDOSCOPY    Anesthesia Start: 1304 Anesthesia Stop: 1337    Procedure: COLONOSCOPY WITH COLD SNARE POLYPECTOMIES Diagnosis:       Encounter for screening for malignant neoplasm of colon      (Encounter for screening for malignant neoplasm of colon [Z12.11])    Surgeons: Merritt Trejo MD Provider: Simba Luther CRNA    Anesthesia Type: general ASA Status: 2            Anesthesia Type: general    Vitals  Vitals Value Taken Time   /88 05/15/25 13:50   Temp 36.8 °C (98.2 °F) 05/15/25 13:50   Pulse 77 05/15/25 13:50   Resp 18 05/15/25 13:50   SpO2 96 % 05/15/25 13:50           Post Anesthesia Care and Evaluation    Post-procedure mental status: acceptable.  Pain management: satisfactory to patient    Airway patency: patent  Anesthetic complications: No anesthetic complications    Cardiovascular status: acceptable  Respiratory status: acceptable    Comments: Per chart review

## 2025-05-19 LAB
CYTO UR: NORMAL
LAB AP CASE REPORT: NORMAL
LAB AP CLINICAL INFORMATION: NORMAL
PATH REPORT.FINAL DX SPEC: NORMAL
PATH REPORT.GROSS SPEC: NORMAL

## 2025-05-20 ENCOUNTER — RESULTS FOLLOW-UP (OUTPATIENT)
Dept: GASTROENTEROLOGY | Facility: HOSPITAL | Age: 50
End: 2025-05-20
Payer: COMMERCIAL

## 2025-06-11 ENCOUNTER — OFFICE VISIT (OUTPATIENT)
Dept: INTERNAL MEDICINE | Age: 50
End: 2025-06-11
Payer: COMMERCIAL

## 2025-06-11 VITALS
SYSTOLIC BLOOD PRESSURE: 107 MMHG | BODY MASS INDEX: 31.21 KG/M2 | WEIGHT: 218 LBS | TEMPERATURE: 98.4 F | DIASTOLIC BLOOD PRESSURE: 79 MMHG | HEART RATE: 83 BPM | HEIGHT: 70 IN | OXYGEN SATURATION: 97 %

## 2025-06-11 DIAGNOSIS — Z00.00 PHYSICAL EXAM, ANNUAL: Primary | ICD-10-CM

## 2025-06-11 DIAGNOSIS — E78.2 MIXED HYPERLIPIDEMIA: ICD-10-CM

## 2025-06-11 DIAGNOSIS — E66.812 CLASS 2 OBESITY DUE TO EXCESS CALORIES WITHOUT SERIOUS COMORBIDITY WITH BODY MASS INDEX (BMI) OF 37.0 TO 37.9 IN ADULT: ICD-10-CM

## 2025-06-11 DIAGNOSIS — Z12.5 SCREENING PSA (PROSTATE SPECIFIC ANTIGEN): ICD-10-CM

## 2025-06-11 DIAGNOSIS — E66.09 CLASS 2 OBESITY DUE TO EXCESS CALORIES WITHOUT SERIOUS COMORBIDITY WITH BODY MASS INDEX (BMI) OF 37.0 TO 37.9 IN ADULT: ICD-10-CM

## 2025-06-11 NOTE — PROGRESS NOTES
"Chief Complaint   Patient presents with    Hyperlipidemia     Routine follow up, no labs done. Zepbound discussion, pt does not want to go back on wegovy. Needing acception request from provider to continue.         Objective   Vital Signs  Vitals:    06/11/25 1202   BP: 107/79   BP Location: Right arm   Patient Position: Sitting   Pulse: 83   Temp: 98.4 °F (36.9 °C)   SpO2: 97%   Weight: 98.9 kg (218 lb)   Height: 177.8 cm (70\")      Body mass index is 31.28 kg/m².  Review of Systems   Constitutional: Negative.    HENT: Negative.     Eyes: Negative.    Respiratory: Negative.     Cardiovascular: Negative.    Gastrointestinal: Negative.    Endocrine: Negative.    Genitourinary: Negative.    Musculoskeletal: Negative.    Allergic/Immunologic: Negative.    Neurological: Negative.    Hematological: Negative.    Psychiatric/Behavioral: Negative.        Physical Exam  Constitutional:       General: He is not in acute distress.     Appearance: Normal appearance.   HENT:      Head: Normocephalic.      Mouth/Throat:      Mouth: Mucous membranes are moist.   Eyes:      Conjunctiva/sclera: Conjunctivae normal.      Pupils: Pupils are equal, round, and reactive to light.   Cardiovascular:      Rate and Rhythm: Normal rate and regular rhythm.      Pulses: Normal pulses.      Heart sounds: Normal heart sounds.   Pulmonary:      Effort: Pulmonary effort is normal.      Breath sounds: Normal breath sounds.   Abdominal:      General: Bowel sounds are normal.      Palpations: Abdomen is soft.   Musculoskeletal:         General: No swelling. Normal range of motion.      Cervical back: Neck supple.   Skin:     General: Skin is warm and dry.      Coloration: Skin is not jaundiced.   Neurological:      General: No focal deficit present.      Mental Status: He is alert and oriented to person, place, and time. Mental status is at baseline.   Psychiatric:         Mood and Affect: Mood normal.         Behavior: Behavior normal.         " Thought Content: Thought content normal.         Judgment: Judgment normal.        Result Review :   Lab Results   Component Value Date     01/15/2021     CMP          12/4/2024    08:17   CMP   Glucose 89    BUN 17    Creatinine 1.11    EGFR 82.4    Sodium 136    Potassium 4.6    Chloride 102    Calcium 9.3    Total Protein 7.1    Albumin 3.9    Globulin 3.2    Total Bilirubin 0.6    Alkaline Phosphatase 46    AST (SGOT) 16    ALT (SGPT) 18    Albumin/Globulin Ratio 1.2    BUN/Creatinine Ratio 15.3    Anion Gap 10.4      CBC w/diff          12/4/2024    08:17   CBC w/Diff   WBC 6.35    RBC 4.94    Hemoglobin 16.1    Hematocrit 47.2    MCV 95.5    MCH 32.6    MCHC 34.1    RDW 12.2    Platelets 207    Neutrophil Rel % 56.4    Immature Granulocyte Rel % 0.2    Lymphocyte Rel % 27.1    Monocyte Rel % 9.4    Eosinophil Rel % 5.2    Basophil Rel % 1.7       Lipid Panel          12/4/2024    08:17   Lipid Panel   Total Cholesterol 111    Triglycerides 94    HDL Cholesterol 39    VLDL Cholesterol 18    LDL Cholesterol  54    LDL/HDL Ratio 1.36       Lab Results   Component Value Date    TSH 0.936 05/28/2024    TSH 0.961 11/30/2023    TSH 1.060 10/25/2023      Lab Results   Component Value Date    FREET4 0.90 (L) 05/28/2024    FREET4 0.98 11/30/2023    FREET4 0.99 10/25/2023      A1C Last 3 Results          12/4/2024    08:17   HGBA1C Last 3 Results   Hemoglobin A1C 4.90                        Visit Diagnoses:    ICD-10-CM ICD-9-CM   1. Physical exam, annual  Z00.00 V70.0   2. Screening PSA (prostate specific antigen)  Z12.5 V76.44   3. Class 2 obesity due to excess calories without serious comorbidity with body mass index (BMI) of 37.0 to 37.9 in adult  E66.812 278.00    E66.09 V85.37    Z68.37    4. Mixed hyperlipidemia  E78.2 272.2       Assessment and Plan   Diagnoses and all orders for this visit:    1. Physical exam, annual (Primary)  -     CBC & Differential; Future  -     Comprehensive Metabolic Panel;  Future  -     PSA Screen; Future  -     Lipid Panel; Future    2. Screening PSA (prostate specific antigen)  -     CBC & Differential; Future  -     Comprehensive Metabolic Panel; Future  -     PSA Screen; Future  -     Lipid Panel; Future    3. Class 2 obesity due to excess calories without serious comorbidity with body mass index (BMI) of 37.0 to 37.9 in adult  -     CBC & Differential; Future  -     Comprehensive Metabolic Panel; Future  -     PSA Screen; Future  -     Lipid Panel; Future    4. Mixed hyperlipidemia  -     CBC & Differential; Future  -     Comprehensive Metabolic Panel; Future  -     PSA Screen; Future  -     Lipid Panel; Future      Colon cancer screening, colonoscopy, May 15, 2025,, 2 small polyps 4 to 5 mm next 1 in 5 years, dr rosario    Coronary calcium scoring February 13, 2025, total score of 3, discussed risk benefits treatment options,    Hand tremors fine, most likely essential tremors not consistent with Parkinson's disease discussed diagnosis treatment options, December 11, 2024, -----continue propranolol 20 mg twice a day as needed sent in for patient,    Annual exam, June 11, 2025, ---preventitive exam --wears seatbelts --, patient does not smoke, only drinks socially, wears a seatbelt, encouraged him to continue aerobic exercise continue weight loss efforts,    Obese,---------- patient is lost approximately 80 pounds now over the past year and a half, continues with zepbound 15 mg weekly dose========== patient is torito need a formulary pharmacy exemption order to continue zepbound based on [facility] current formulary=============== patient is tried Wegovy in the past became unsuccessful with the medication did not work well he switched over to zepbound had good success with the medication and would like to continue with current weight loss progress,    Hemoglobin A1c 4.9 December 4, 2024    Cerumen impaction---    Mixed hyperlipidemia --continues rosuvastatin 10 mg daily,    Sleep issues  , home sleep study June 6, 2023, ===cont cpap ---    PSA 0.46 may 2024     Follow Up   Return in about 6 months (around 12/11/2025).  Patient was given instructions and counseling regarding his condition or for health maintenance advice. Please see specific information pulled into the AVS if appropriate.

## 2025-06-18 RX ORDER — PROPRANOLOL HCL 20 MG
20 TABLET ORAL 2 TIMES DAILY
Qty: 60 TABLET | Refills: 5 | Status: SHIPPED | OUTPATIENT
Start: 2025-06-18

## 2025-07-21 NOTE — TELEPHONE ENCOUNTER
Pa approved for ambrose 15mg okay to send? They will not cover zepbound but will cover mounjaro for pt

## 2025-07-31 ENCOUNTER — TELEPHONE (OUTPATIENT)
Dept: INTERNAL MEDICINE | Age: 50
End: 2025-07-31
Payer: COMMERCIAL

## 2025-07-31 NOTE — TELEPHONE ENCOUNTER
Caller: Calvin Nye    Relationship: Self    Best call back number: [phone]    What was the call regarding: PATIENT IS CALLING BACK TO DHAVAL WHO CALLED TO LET HIM KNOW SHE COULD NOT GET [facility] REGARDING HIS MEDICATION. HE STATED IT IS [facility], NOT [facility]

## 2025-08-05 DIAGNOSIS — E66.812 CLASS 2 OBESITY DUE TO EXCESS CALORIES WITHOUT SERIOUS COMORBIDITY WITH BODY MASS INDEX (BMI) OF 37.0 TO 37.9 IN ADULT: Primary | ICD-10-CM

## 2025-08-05 DIAGNOSIS — E66.09 CLASS 2 OBESITY DUE TO EXCESS CALORIES WITHOUT SERIOUS COMORBIDITY WITH BODY MASS INDEX (BMI) OF 37.0 TO 37.9 IN ADULT: Primary | ICD-10-CM

## 2025-08-05 RX ORDER — METFORMIN HYDROCHLORIDE 500 MG/1
500 TABLET, EXTENDED RELEASE ORAL 2 TIMES DAILY
Qty: 60 TABLET | Refills: 5 | Status: SHIPPED | OUTPATIENT
Start: 2025-08-05

## (undated) DEVICE — Device

## (undated) DEVICE — SNAR E/S POLYP SNAREMASTER OVL/10MM 2.8X2300MM YEL

## (undated) DEVICE — SOL IRRG H2O PL/BG 1000ML STRL

## (undated) DEVICE — SOLIDIFIER LIQLOC PLS 1500CC BT

## (undated) DEVICE — DEFENDO AIR WATER SUCTION AND BIOPSY VALVE KIT FOR  OLYMPUS: Brand: DEFENDO AIR/WATER/SUCTION AND BIOPSY VALVE

## (undated) DEVICE — THE SINGLE USE ETRAP – POLYP TRAP IS USED FOR SUCTION RETRIEVAL OF ENDOSCOPICALLY REMOVED POLYPS.: Brand: ETRAP